# Patient Record
Sex: FEMALE | Race: WHITE | Employment: UNEMPLOYED | URBAN - METROPOLITAN AREA
[De-identification: names, ages, dates, MRNs, and addresses within clinical notes are randomized per-mention and may not be internally consistent; named-entity substitution may affect disease eponyms.]

---

## 2018-05-09 PROBLEM — N28.1 BILATERAL RENAL CYSTS: Status: ACTIVE | Noted: 2018-05-09

## 2020-08-21 LAB — CREATININE, EXTERNAL: 0.8

## 2020-09-21 ENCOUNTER — OFFICE VISIT (OUTPATIENT)
Dept: SURGERY | Age: 60
End: 2020-09-21
Payer: COMMERCIAL

## 2020-09-21 VITALS — TEMPERATURE: 98.4 F | BODY MASS INDEX: 27.12 KG/M2 | WEIGHT: 168 LBS

## 2020-09-21 DIAGNOSIS — N32.9 URINARY BLADDER DISORDER: Primary | ICD-10-CM

## 2020-09-21 PROCEDURE — 99203 OFFICE O/P NEW LOW 30 MIN: CPT | Performed by: SURGERY

## 2020-09-21 RX ORDER — ESTRADIOL 1 MG/1
1 TABLET ORAL DAILY
COMMUNITY

## 2020-09-21 RX ORDER — BUPROPION HYDROCHLORIDE 150 MG/1
150 TABLET ORAL
COMMUNITY
End: 2020-09-21

## 2020-09-21 RX ORDER — BUPROPION HYDROCHLORIDE 150 MG/1
TABLET ORAL
Qty: 30 TAB | Refills: 3 | Status: SHIPPED | OUTPATIENT
Start: 2020-09-21

## 2020-09-21 NOTE — PROGRESS NOTES
History of Present Illness  Frieda Aguirre is a 63 y.o. female presents for Abdominal Pain     63year old female with several month history of suprapubic pain, deep pelvic pain that radiates bilateral lower pelvis. She feels something is \"falling out\" of her pelvic region but does not appreciate any bulging in her vagina. She feels inability to completely void. She had complete hysterectomy in the past for endometriosis and reports no problems since her surgery. Last colonoscopy was 2 years ago and was normal. She was recently seen by GYN for this pain and underwent pelvic ultrasound which the patient reported was normal.     Review of Systems   Constitutional: Negative for chills, diaphoresis, fever, malaise/fatigue and weight loss. Respiratory: Negative for cough, hemoptysis and shortness of breath. Cardiovascular: Negative for chest pain, palpitations, orthopnea and claudication. Gastrointestinal: Positive for abdominal pain. Negative for blood in stool, constipation, heartburn, nausea and vomiting. Genitourinary: Negative for dysuria, flank pain, frequency, hematuria and urgency. Musculoskeletal: Negative for myalgias and neck pain. Skin: Negative for itching and rash. Neurological: Negative for dizziness and headaches. Endo/Heme/Allergies: Does not bruise/bleed easily. Current Outpatient Medications:     estradioL (ESTRACE) 1 mg tablet, Take 1 mg by mouth daily. , Disp: , Rfl:     buPROPion XL (WELLBUTRIN XL) 150 mg tablet, Take 150 mg by mouth every morning., Disp: , Rfl:     PROAIR HFA 90 mcg/actuation inhaler, , Disp: , Rfl: 5    ALPRAZolam (XANAX) 0.5 mg tablet, , Disp: , Rfl: 1    fluticasone (FLONASE) 50 mcg/actuation nasal spray, , Disp: , Rfl: 2    lisinopril (PRINIVIL, ZESTRIL) 20 mg tablet, , Disp: , Rfl: 5    pantoprazole (PROTONIX) 40 mg tablet, , Disp: , Rfl: 3    acetaminophen (TYLENOL) 325 mg tablet, Take  by mouth every four (4) hours as needed for Pain., Disp: , Rfl:     PREMARIN 0.625 mg tablet, , Disp: , Rfl: 3    Allergies   Allergen Reactions    Aspirin Unknown (comments)    Avelox [Moxifloxacin] Unknown (comments)    Bactrim [Sulfamethoprim] Unknown (comments)    Biaxin [Clarithromycin] Drowsiness    Ciprofloxacin Drowsiness    Citalopram Unknown (comments)    Clindamycin Unknown (comments)    Doxycycline Unknown (comments)    Levaquin [Levofloxacin] Unknown (comments)    Macrobid [Nitrofurantoin Monohyd/M-Cryst] Unknown (comments)    Penicillins Unknown (comments)    Percocet [Oxycodone-Acetaminophen] Unknown (comments)    Pravachol [Pravastatin] Unknown (comments)    Sulfa (Sulfonamide Antibiotics) Drowsiness    Tamiflu [Oseltamivir] Unknown (comments)    Tetracycline Unknown (comments)    Vancomycin Unknown (comments)    Vibramycin [Doxycycline Calcium] Unknown (comments)       Past Medical History:   Diagnosis Date    Anxiety     Chronic sinusitis     GERD (gastroesophageal reflux disease)     History of recurrent UTIs     Hypertension     Left arm pain     Low back pain     Myeloradiculopathy     Neck pain     Renal cyst        Past Surgical History:   Procedure Laterality Date    HX HEMORRHOIDECTOMY      HX HYSTERECTOMY      HX LUMBAR FUSION      HX LUMBAR LAMINECTOMY      HX OTHER SURGICAL      nasal surgery    HX OTHER SURGICAL      foot surgery    HX OTHER SURGICAL      Cervical dempression    HX OTHER SURGICAL      cervical fusion       Family History   Problem Relation Age of Onset    Hypertension Mother     COPD Father         Social History     Socioeconomic History    Marital status:      Spouse name: Not on file    Number of children: Not on file    Years of education: Not on file    Highest education level: Not on file   Occupational History    Not on file   Social Needs    Financial resource strain: Not on file    Food insecurity     Worry: Not on file     Inability: Not on file   Blowtorch needs     Medical: Not on file     Non-medical: Not on file   Tobacco Use    Smoking status: Former Smoker    Smokeless tobacco: Never Used    Tobacco comment: Quit in    Substance and Sexual Activity    Alcohol use: No    Drug use: No    Sexual activity: Not on file   Lifestyle    Physical activity     Days per week: Not on file     Minutes per session: Not on file    Stress: Not on file   Relationships    Social connections     Talks on phone: Not on file     Gets together: Not on file     Attends Taoism service: Not on file     Active member of club or organization: Not on file     Attends meetings of clubs or organizations: Not on file     Relationship status: Not on file    Intimate partner violence     Fear of current or ex partner: Not on file     Emotionally abused: Not on file     Physically abused: Not on file     Forced sexual activity: Not on file   Other Topics Concern    Not on file   Social History Narrative    Not on file       XR Results (maximum last 2): No results found for this or any previous visit. CT Results (maximum last 2):   Results from Abstract encounter on 20   CT ABD PELV W WO CONT   Results from Abstract encounter on 18   CT ABD PELV W CONT    Impression Auth Prov: Minnie Orthodoxy  CC Prov:                 [address] [facility] Cat Scan  [address]  [phone]      Imaging Result     Name:    Giancarlo Duque ([id number])  Sex: Female :    61year old   Patient Class:  Emergency Dept Diagnosis:                  Procedures Performed: Exam Date/Time: Reason for Exam:  CT ABD/PELVIS-IV ONLY  [id number]  2018 11:12 PM   Abdominal pain, unspecified              EXAM: CT of the abdomen and pelvis     INDICATION: Abdominal pain.     COMPARISON: None.     TECHNIQUE: Axial CT imaging of the abdomen and pelvis was performed without oral and left intravenous contrast. Multiplanar reformats were generated. One or more dose reduction techniques were used on this CT: automated exposure control, adjustment of the mAs and/or kVp according to patient size, and iterative reconstruction techniques. The specific techniques used on this CT exam have been documented in the patient's electronic medical record.     _______________     FINDINGS:     LOWER CHEST: Unremarkable.     LIVER, BILIARY: Liver is normal. No biliary dilation. Gallbladder is unremarkable.     PANCREAS: Normal.     SPLEEN: Normal.     ADRENALS: Normal.     KIDNEYS: Renal parenchymal enhancement is symmetric. No hydronephrosis or nephrolithiasis. There is a slightly exophytic nodule arising from the upper pole of the left kidney (image 36) with attenuation measuring approximately 26 Hounsfield units. Size of this nodule is approximately 1.1 x 1.5 cm. Upper pole right and lower pole left renal cysts noted.     LYMPH NODES: No enlarged lymph nodes.     GASTROINTESTINAL TRACT: There is a small hiatal hernia. Normal caliber small and large intestine. No bowel obstruction. No free intraperitoneal gas. Normal appendix.       PELVIC ORGANS: Prior hysterectomy.     VASCULATURE: Aortobiiliac atherosclerotic calcification present without evidence of aneurysmal dilatation.     BONES: No acute or aggressive osseous abnormalities identified. Prior posterior instrumented spinal fusion, decompression, an interbody fixation L4-L5.     OTHER: None.     _______________  IMPRESSION  IMPRESSION:     1. No acute intra-abdominal or pelvic abnormality. 2. Small hiatal hernia. 3. Small bilateral renal cysts, with additional indeterminate upper pole left renal hypodensity, potentially a proteinaceous cyst. Comparison prior cross-sectional imaging recommended if available.  If no prior exams are available, further characterization could be performed with a nonemergent retroperitoneal sonogram.     Dictated by: Nathaniel Caban on Wed Apr 18, 2018 11:42:26 PM EDT      Signed Betzaida Contreras MD   4/18/2018 11:48 PM    [facility] [[id number]]    ~~This report was copied and pasted from the servicing EHR system. ~~     CT ABD PELV W CONT       MRI Results (maximum last 2): Results from Abstract encounter on 11/02/15   MRI 62 [facility]       Nuclear Medicine Results (maximum last 3): No results found for this or any previous visit. US Results (maximum last 2): Results from Abstract encounter on 05/08/18   US RETROPERITONEUM COMP       STEF Results (maximum last 2): No results found for this or any previous visit. IR Results (maximum last 2): No results found for this or any previous visit. VAS/US Results (maximum last 2): No results found for this or any previous visit. PET Results (maximum last 2): No results found for this or any previous visit. Visit Vitals  Temp 98.4 °F (36.9 °C)   Wt 76.2 kg (168 lb)   BMI 27.12 kg/m²        Physical Exam  Constitutional:       Appearance: Normal appearance. She is normal weight. HENT:      Head: Normocephalic and atraumatic. Eyes:      Extraocular Movements: Extraocular movements intact. Conjunctiva/sclera: Conjunctivae normal.      Pupils: Pupils are equal, round, and reactive to light. Pulmonary:      Effort: Pulmonary effort is normal.   Abdominal:      General: Abdomen is flat. Bowel sounds are normal. There is no distension. Palpations: Abdomen is soft. There is no mass. Tenderness: There is abdominal tenderness. There is left CVA tenderness. There is no right CVA tenderness, guarding or rebound. Hernia: No hernia is present. Comments: Tender suprapubic region    Musculoskeletal: Normal range of motion. Skin:     General: Skin is warm and dry. Neurological:      General: No focal deficit present. Mental Status: She is alert and oriented to person, place, and time. Mental status is at baseline.    Psychiatric:         Mood and Affect: Mood normal. Behavior: Behavior normal.         Thought Content: Thought content normal.         Judgment: Judgment normal.         Assessment and Plan:  1. Urinary bladder disorder- I do not appreciate any hernia on exam but this sounds like a possible cystocele or urological problem outside of my specialty. GYN did not appreciate anything on exam either per patient . Imaging was reviewed. I will refer her to urology for additional workup. She understands this recommendation. Lion Reid, DO    CC Providers:  Garret Rodrigues MD  No ref.  provider found

## 2020-09-21 NOTE — PROGRESS NOTES
Macie Back presents today for   Chief Complaint   Patient presents with    Abdominal Pain       Is someone accompanying this pt? yes    Is the patient using any DME equipment during OV? no    Depression Screening:  3 most recent PHQ Screens 9/21/2020   Little interest or pleasure in doing things Not at all   Feeling down, depressed, irritable, or hopeless Not at all   Total Score PHQ 2 0       Learning Assessment:  No flowsheet data found. Abuse Screening:  No flowsheet data found. Fall Risk  No flowsheet data found. Coordination of Care:  1. Have you been to the ER, urgent care clinic since your last visit? Hospitalized since your last visit? NA, referal from Dr Antonio Bautista    2. Have you seen or consulted any other health care providers outside of the [address] [place] [address] since your last visit? Include any pap smears or colon screening.  no

## 2020-10-07 ENCOUNTER — HOSPITAL ENCOUNTER (OUTPATIENT)
Dept: LAB | Age: 60
Discharge: HOME | End: 2020-10-07

## 2020-10-07 ENCOUNTER — TRANSCRIBE ORDER (OUTPATIENT)
Dept: REGISTRATION | Age: 60
End: 2020-10-07

## 2020-10-07 DIAGNOSIS — E53.8 BIOTIN-(PROPIONYL-COA-CARBOXYLASE) LIGASE DEFICIENCY: ICD-10-CM

## 2020-10-07 DIAGNOSIS — D52.9 FOLATE-DEFICIENCY ANEMIA: ICD-10-CM

## 2020-10-07 DIAGNOSIS — D64.9 ANEMIA, UNSPECIFIED: ICD-10-CM

## 2020-10-07 DIAGNOSIS — E78.00 PURE HYPERCHOLESTEROLEMIA: ICD-10-CM

## 2020-10-07 DIAGNOSIS — I10 ESSENTIAL HYPERTENSION, MALIGNANT: ICD-10-CM

## 2020-10-07 DIAGNOSIS — I10 ESSENTIAL HYPERTENSION, MALIGNANT: Primary | ICD-10-CM

## 2022-03-19 PROBLEM — N28.1 BILATERAL RENAL CYSTS: Status: ACTIVE | Noted: 2018-05-09

## 2022-06-14 ENCOUNTER — HOSPITAL ENCOUNTER (OUTPATIENT)
Dept: MAMMOGRAPHY | Age: 62
Discharge: HOME | End: 2022-06-14

## 2022-06-14 ENCOUNTER — TRANSCRIBE ORDER (OUTPATIENT)
Dept: SCHEDULING | Age: 62
End: 2022-06-14

## 2022-06-14 DIAGNOSIS — Z12.31 VISIT FOR SCREENING MAMMOGRAM: Primary | ICD-10-CM

## 2022-06-14 DIAGNOSIS — Z12.31 VISIT FOR SCREENING MAMMOGRAM: ICD-10-CM

## 2022-06-14 PROCEDURE — 77063 BREAST TOMOSYNTHESIS BI: CPT

## 2024-05-09 ENCOUNTER — HOSPITAL ENCOUNTER (EMERGENCY)
Age: 64
Discharge: ANOTHER HOSPITAL | End: 2024-05-09
Attending: EMERGENCY MEDICINE
Payer: COMMERCIAL

## 2024-05-09 ENCOUNTER — APPOINTMENT (OUTPATIENT)
Age: 64
End: 2024-05-09
Payer: COMMERCIAL

## 2024-05-09 VITALS
OXYGEN SATURATION: 100 % | HEIGHT: 66 IN | HEART RATE: 78 BPM | BODY MASS INDEX: 23.46 KG/M2 | SYSTOLIC BLOOD PRESSURE: 132 MMHG | WEIGHT: 146 LBS | DIASTOLIC BLOOD PRESSURE: 63 MMHG | TEMPERATURE: 98.5 F | RESPIRATION RATE: 16 BRPM

## 2024-05-09 DIAGNOSIS — K81.0 ACUTE CHOLECYSTITIS: Primary | ICD-10-CM

## 2024-05-09 DIAGNOSIS — K83.1 BILIARY OBSTRUCTION: ICD-10-CM

## 2024-05-09 LAB
ALBUMIN SERPL-MCNC: 3.4 G/DL (ref 3.4–5)
ALBUMIN/GLOB SERPL: 0.8 (ref 0.8–1.7)
ALP SERPL-CCNC: 100 U/L (ref 45–117)
ALT SERPL-CCNC: 15 U/L (ref 13–56)
ANION GAP SERPL CALC-SCNC: 5 MMOL/L (ref 3–18)
AST SERPL W P-5'-P-CCNC: 9 U/L (ref 10–38)
BASOPHILS # BLD: 0.1 K/UL (ref 0–0.1)
BASOPHILS NFR BLD: 1 % (ref 0–2)
BILIRUB DIRECT SERPL-MCNC: <0.1 MG/DL (ref 0–0.2)
BILIRUB SERPL-MCNC: 0.1 MG/DL (ref 0.2–1)
BUN SERPL-MCNC: 11 MG/DL (ref 7–18)
BUN/CREAT SERPL: 11 (ref 12–20)
CA-I BLD-MCNC: 9 MG/DL (ref 8.5–10.1)
CHLORIDE SERPL-SCNC: 110 MMOL/L (ref 100–111)
CO2 SERPL-SCNC: 29 MMOL/L (ref 21–32)
CREAT SERPL-MCNC: 0.98 MG/DL (ref 0.6–1.3)
DIFFERENTIAL METHOD BLD: ABNORMAL
EOSINOPHIL # BLD: 0.2 K/UL (ref 0–0.4)
EOSINOPHIL NFR BLD: 2 % (ref 0–5)
ERYTHROCYTE [DISTWIDTH] IN BLOOD BY AUTOMATED COUNT: 13.2 % (ref 11.6–14.5)
GLOBULIN SER CALC-MCNC: 4.1 G/DL (ref 2–4)
GLUCOSE SERPL-MCNC: 110 MG/DL (ref 74–99)
HCT VFR BLD AUTO: 36.3 % (ref 35–45)
HGB BLD-MCNC: 12 G/DL (ref 12–16)
IMM GRANULOCYTES # BLD AUTO: 0 K/UL (ref 0–0.04)
IMM GRANULOCYTES NFR BLD AUTO: 0 % (ref 0–0.5)
LIPASE SERPL-CCNC: 29 U/L (ref 13–75)
LYMPHOCYTES # BLD: 2.2 K/UL (ref 0.9–3.6)
LYMPHOCYTES NFR BLD: 24 % (ref 21–52)
MCH RBC QN AUTO: 30.6 PG (ref 24–34)
MCHC RBC AUTO-ENTMCNC: 33.1 G/DL (ref 31–37)
MCV RBC AUTO: 92.6 FL (ref 78–100)
MONOCYTES # BLD: 0.7 K/UL (ref 0.05–1.2)
MONOCYTES NFR BLD: 7 % (ref 3–10)
NEUTS SEG # BLD: 6.1 K/UL (ref 1.8–8)
NEUTS SEG NFR BLD: 66 % (ref 40–73)
NRBC # BLD: 0 K/UL (ref 0–0.01)
NRBC BLD-RTO: 0 PER 100 WBC
PLATELET # BLD AUTO: 269 K/UL (ref 135–420)
PMV BLD AUTO: 11 FL (ref 9.2–11.8)
POTASSIUM SERPL-SCNC: 3.7 MMOL/L (ref 3.5–5.5)
PROT SERPL-MCNC: 7.5 G/DL (ref 6.4–8.2)
RBC # BLD AUTO: 3.92 M/UL (ref 4.2–5.3)
SODIUM SERPL-SCNC: 144 MMOL/L (ref 136–145)
TROPONIN I SERPL HS-MCNC: 5 NG/L (ref 0–54)
WBC # BLD AUTO: 9.3 K/UL (ref 4.6–13.2)

## 2024-05-09 PROCEDURE — 96375 TX/PRO/DX INJ NEW DRUG ADDON: CPT

## 2024-05-09 PROCEDURE — 93005 ELECTROCARDIOGRAM TRACING: CPT | Performed by: EMERGENCY MEDICINE

## 2024-05-09 PROCEDURE — 96361 HYDRATE IV INFUSION ADD-ON: CPT

## 2024-05-09 PROCEDURE — 99285 EMERGENCY DEPT VISIT HI MDM: CPT

## 2024-05-09 PROCEDURE — 6360000002 HC RX W HCPCS: Performed by: EMERGENCY MEDICINE

## 2024-05-09 PROCEDURE — 74177 CT ABD & PELVIS W/CONTRAST: CPT

## 2024-05-09 PROCEDURE — 80048 BASIC METABOLIC PNL TOTAL CA: CPT

## 2024-05-09 PROCEDURE — 85025 COMPLETE CBC W/AUTO DIFF WBC: CPT

## 2024-05-09 PROCEDURE — 96376 TX/PRO/DX INJ SAME DRUG ADON: CPT

## 2024-05-09 PROCEDURE — 87040 BLOOD CULTURE FOR BACTERIA: CPT

## 2024-05-09 PROCEDURE — 2580000003 HC RX 258: Performed by: EMERGENCY MEDICINE

## 2024-05-09 PROCEDURE — 80076 HEPATIC FUNCTION PANEL: CPT

## 2024-05-09 PROCEDURE — 6360000004 HC RX CONTRAST MEDICATION: Performed by: EMERGENCY MEDICINE

## 2024-05-09 PROCEDURE — 83690 ASSAY OF LIPASE: CPT

## 2024-05-09 PROCEDURE — 84484 ASSAY OF TROPONIN QUANT: CPT

## 2024-05-09 PROCEDURE — 96365 THER/PROPH/DIAG IV INF INIT: CPT

## 2024-05-09 RX ORDER — ONDANSETRON 2 MG/ML
4 INJECTION INTRAMUSCULAR; INTRAVENOUS ONCE
Status: COMPLETED | OUTPATIENT
Start: 2024-05-09 | End: 2024-05-09

## 2024-05-09 RX ORDER — LORAZEPAM 2 MG/ML
0.5 INJECTION INTRAMUSCULAR ONCE
Status: COMPLETED | OUTPATIENT
Start: 2024-05-09 | End: 2024-05-09

## 2024-05-09 RX ORDER — DIPHENHYDRAMINE HYDROCHLORIDE 50 MG/ML
50 INJECTION INTRAMUSCULAR; INTRAVENOUS
Status: COMPLETED | OUTPATIENT
Start: 2024-05-09 | End: 2024-05-09

## 2024-05-09 RX ORDER — METRONIDAZOLE 500 MG/100ML
500 INJECTION, SOLUTION INTRAVENOUS ONCE
Status: COMPLETED | OUTPATIENT
Start: 2024-05-09 | End: 2024-05-09

## 2024-05-09 RX ORDER — 0.9 % SODIUM CHLORIDE 0.9 %
1000 INTRAVENOUS SOLUTION INTRAVENOUS ONCE
Status: COMPLETED | OUTPATIENT
Start: 2024-05-09 | End: 2024-05-09

## 2024-05-09 RX ORDER — MORPHINE SULFATE 4 MG/ML
4 INJECTION, SOLUTION INTRAMUSCULAR; INTRAVENOUS
Status: COMPLETED | OUTPATIENT
Start: 2024-05-09 | End: 2024-05-09

## 2024-05-09 RX ADMIN — SODIUM CHLORIDE 1000 ML: 9 INJECTION, SOLUTION INTRAVENOUS at 16:00

## 2024-05-09 RX ADMIN — ONDANSETRON 4 MG: 2 INJECTION INTRAMUSCULAR; INTRAVENOUS at 16:00

## 2024-05-09 RX ADMIN — LORAZEPAM 0.5 MG: 2 INJECTION INTRAMUSCULAR; INTRAVENOUS at 21:01

## 2024-05-09 RX ADMIN — HYDROMORPHONE HYDROCHLORIDE 1 MG: 1 INJECTION, SOLUTION INTRAMUSCULAR; INTRAVENOUS; SUBCUTANEOUS at 17:52

## 2024-05-09 RX ADMIN — IOPAMIDOL 95 ML: 755 INJECTION, SOLUTION INTRAVENOUS at 16:03

## 2024-05-09 RX ADMIN — HYDROMORPHONE HYDROCHLORIDE 0.5 MG: 1 INJECTION, SOLUTION INTRAMUSCULAR; INTRAVENOUS; SUBCUTANEOUS at 20:27

## 2024-05-09 RX ADMIN — DIPHENHYDRAMINE HYDROCHLORIDE 50 MG: 50 INJECTION INTRAMUSCULAR; INTRAVENOUS at 17:56

## 2024-05-09 RX ADMIN — MORPHINE SULFATE 4 MG: 4 INJECTION, SOLUTION INTRAMUSCULAR; INTRAVENOUS at 16:00

## 2024-05-09 RX ADMIN — METRONIDAZOLE 500 MG: 500 INJECTION, SOLUTION INTRAVENOUS at 17:52

## 2024-05-09 RX ADMIN — WATER 1000 MG: 1 INJECTION INTRAMUSCULAR; INTRAVENOUS; SUBCUTANEOUS at 17:52

## 2024-05-09 ASSESSMENT — LIFESTYLE VARIABLES
HOW OFTEN DO YOU HAVE A DRINK CONTAINING ALCOHOL: NEVER
HOW MANY STANDARD DRINKS CONTAINING ALCOHOL DO YOU HAVE ON A TYPICAL DAY: PATIENT DOES NOT DRINK

## 2024-05-09 ASSESSMENT — PAIN SCALES - GENERAL: PAINLEVEL_OUTOF10: 7

## 2024-05-09 ASSESSMENT — PAIN DESCRIPTION - DESCRIPTORS: DESCRIPTORS: ACHING

## 2024-05-09 ASSESSMENT — PAIN DESCRIPTION - LOCATION: LOCATION: ABDOMEN

## 2024-05-09 ASSESSMENT — PAIN DESCRIPTION - ORIENTATION: ORIENTATION: RIGHT;UPPER

## 2024-05-09 NOTE — ED NOTES
Transfer center called to initiate transfer, [facility]    [facility] due to more resources for the patient     [facility]

## 2024-05-09 NOTE — ED PROVIDER NOTES
[facility] EMERGENCY DEPT  EMERGENCY DEPARTMENT HISTORY AND PHYSICAL EXAM      Date: 5/9/2024  Patient Name: Chichi Ocasio  MRN: [id number]  Birthdate 1960  Date of evaluation: 5/9/2024  Provider: Bruce Ny MD   Note Started: 3:40 PM EDT 5/9/24    HISTORY OF PRESENT ILLNESS     Chief Complaint   Patient presents with    Abdominal Pain       History Provided By: Patient    HPI: Chichi Ocasio is a 63 y.o. female reorts pain right flank and luq pain. Uncomfortable, writhing in pain. Reports nausea and one episode of vomiting.  Symptoms started suddenly today    PAST MEDICAL HISTORY   Past Medical History:  Past Medical History:   Diagnosis Date    Anxiety     Chronic sinusitis     Endocrine disorder     GERD (gastroesophageal reflux disease)     History of recurrent UTIs     Hypertension     Left arm pain     Low back pain     Menopause     Myeloradiculopathy     Neck pain     Renal cyst        Past Surgical History:  Past Surgical History:   Procedure Laterality Date    HEMORRHOID SURGERY      HYSTERECTOMY (CERVIX STATUS UNKNOWN)  1995?    Total    LUMBAR FUSION      LUMBAR LAMINECTOMY      OTHER SURGICAL HISTORY      foot surgery    OTHER SURGICAL HISTORY      Cervical dempression    OTHER SURGICAL HISTORY      cervical fusion    OTHER SURGICAL HISTORY      nasal surgery    OVARY REMOVAL         Family History:  Family History   Problem Relation Age of Onset    COPD Father     Hypertension Mother     Breast Cancer Maternal Aunt        Social History:  Social History     Tobacco Use    Smoking status: Former    Smokeless tobacco: Never    Tobacco comments:     Quit smoking: Quit in 2012   Substance Use Topics    Alcohol use: No    Drug use: No       Allergies:  Allergies   Allergen Reactions    Aspirin      Other reaction(s): Unknown (comments)    Ciprofloxacin      Other reaction(s): Drowsiness    Citalopram      Other reaction(s): Unknown (comments)    Clarithromycin      Other reaction(s): Drowsiness

## 2024-05-09 NOTE — ED TRIAGE NOTES
Abdominal pain since early this morning. States that she took gas medication with no relief. Normal BM this morning. No vomiting. No nausea. Last ate earlier today.

## 2024-05-09 NOTE — ED NOTES
Reports that her pain is better but still very uncomfortable. O2 placed on at 2 LPM for sats 92-95%.

## 2024-05-10 ENCOUNTER — HOSPITAL ENCOUNTER (INPATIENT)
Facility: HOSPITAL | Age: 64
LOS: 1 days | Discharge: HOME | DRG: 419 | End: 2024-05-11
Attending: STUDENT IN AN ORGANIZED HEALTH CARE EDUCATION/TRAINING PROGRAM | Admitting: HOSPITALIST
Payer: COMMERCIAL

## 2024-05-10 ENCOUNTER — ANESTHESIA EVENT (OUTPATIENT)
Facility: HOSPITAL | Age: 64
End: 2024-05-10
Payer: COMMERCIAL

## 2024-05-10 ENCOUNTER — ANESTHESIA (OUTPATIENT)
Facility: HOSPITAL | Age: 64
End: 2024-05-10
Payer: COMMERCIAL

## 2024-05-10 DIAGNOSIS — K81.9 CHOLECYSTITIS: Primary | ICD-10-CM

## 2024-05-10 DIAGNOSIS — K83.8 INTRAHEPATIC BILE DUCT DILATION: ICD-10-CM

## 2024-05-10 DIAGNOSIS — K81.0 ACUTE CHOLECYSTITIS: ICD-10-CM

## 2024-05-10 LAB
ALBUMIN SERPL-MCNC: 3 G/DL (ref 3.5–5)
ALBUMIN/GLOB SERPL: 0.8 (ref 1.1–2.2)
ALP SERPL-CCNC: 80 U/L (ref 45–117)
ALT SERPL-CCNC: 13 U/L (ref 12–78)
ANION GAP SERPL CALC-SCNC: 2 MMOL/L (ref 5–15)
APTT PPP: 28.2 SEC (ref 21.2–34.1)
AST SERPL W P-5'-P-CCNC: 12 U/L (ref 15–37)
BASOPHILS # BLD: 0 K/UL (ref 0–0.1)
BASOPHILS NFR BLD: 0 % (ref 0–1)
BILIRUB DIRECT SERPL-MCNC: <0.1 MG/DL (ref 0–0.2)
BILIRUB SERPL-MCNC: 0.2 MG/DL (ref 0.2–1)
BUN SERPL-MCNC: 8 MG/DL (ref 6–20)
BUN/CREAT SERPL: 9 (ref 12–20)
CA-I BLD-MCNC: 8.3 MG/DL (ref 8.5–10.1)
CHLORIDE SERPL-SCNC: 110 MMOL/L (ref 97–108)
CK SERPL-CCNC: 61 U/L (ref 26–192)
CO2 SERPL-SCNC: 27 MMOL/L (ref 21–32)
CREAT SERPL-MCNC: 0.88 MG/DL (ref 0.55–1.02)
DIFFERENTIAL METHOD BLD: ABNORMAL
EKG ATRIAL RATE: 65 BPM
EKG DIAGNOSIS: NORMAL
EKG DIAGNOSIS: NORMAL
EKG P AXIS: 86 DEGREES
EKG P-R INTERVAL: 172 MS
EKG Q-T INTERVAL: 428 MS
EKG Q-T INTERVAL: 444 MS
EKG QRS DURATION: 82 MS
EKG QRS DURATION: 86 MS
EKG QTC CALCULATION (BAZETT): 445 MS
EKG QTC CALCULATION (BAZETT): 469 MS
EKG R AXIS: 45 DEGREES
EKG R AXIS: 64 DEGREES
EKG T AXIS: 50 DEGREES
EKG T AXIS: 67 DEGREES
EKG VENTRICULAR RATE: 65 BPM
EKG VENTRICULAR RATE: 67 BPM
EOSINOPHIL # BLD: 0.1 K/UL (ref 0–0.4)
EOSINOPHIL NFR BLD: 1 % (ref 0–7)
ERYTHROCYTE [DISTWIDTH] IN BLOOD BY AUTOMATED COUNT: 13.1 % (ref 11.5–14.5)
GLOBULIN SER CALC-MCNC: 3.6 G/DL (ref 2–4)
GLUCOSE SERPL-MCNC: 102 MG/DL (ref 65–100)
HCT VFR BLD AUTO: 33.5 % (ref 35–47)
HGB BLD-MCNC: 10.7 G/DL (ref 11.5–16)
IMM GRANULOCYTES # BLD AUTO: 0 K/UL
IMM GRANULOCYTES NFR BLD AUTO: 0 %
INR PPP: 1.1 (ref 0.9–1.1)
LIPASE SERPL-CCNC: 14 U/L (ref 13–75)
LYMPHOCYTES # BLD: 2 K/UL (ref 0.8–3.5)
LYMPHOCYTES NFR BLD: 17 % (ref 12–49)
MCH RBC QN AUTO: 29.7 PG (ref 26–34)
MCHC RBC AUTO-ENTMCNC: 31.9 G/DL (ref 30–36.5)
MCV RBC AUTO: 93.1 FL (ref 80–99)
MONOCYTES # BLD: 0.8 K/UL (ref 0–1)
MONOCYTES NFR BLD: 7 % (ref 5–13)
NEUTS SEG # BLD: 8.6 K/UL (ref 1.8–8)
NEUTS SEG NFR BLD: 75 % (ref 32–75)
NRBC BLD-RTO: 0 PER 100 WBC
PHOSPHATE SERPL-MCNC: 2.2 MG/DL (ref 2.6–4.7)
PLATELET # BLD AUTO: 229 K/UL (ref 150–400)
PMV BLD AUTO: 11.2 FL (ref 8.9–12.9)
POTASSIUM SERPL-SCNC: 4 MMOL/L (ref 3.5–5.1)
PROCALCITONIN SERPL-MCNC: <0.05 NG/ML
PROT SERPL-MCNC: 6.6 G/DL (ref 6.4–8.2)
PROTHROMBIN TIME: 14.4 SEC (ref 11.9–14.6)
RBC # BLD AUTO: 3.6 M/UL (ref 3.8–5.2)
RBC MORPH BLD: ABNORMAL
SODIUM SERPL-SCNC: 139 MMOL/L (ref 136–145)
THERAPEUTIC RANGE: NORMAL SEC (ref 82–109)
WBC # BLD AUTO: 11.5 K/UL (ref 3.6–11)
WBC MORPH BLD: ABNORMAL

## 2024-05-10 PROCEDURE — 7100000001 HC PACU RECOVERY - ADDTL 15 MIN: Performed by: SURGERY

## 2024-05-10 PROCEDURE — 99285 EMERGENCY DEPT VISIT HI MDM: CPT

## 2024-05-10 PROCEDURE — 85610 PROTHROMBIN TIME: CPT

## 2024-05-10 PROCEDURE — 6360000002 HC RX W HCPCS: Performed by: SURGERY

## 2024-05-10 PROCEDURE — 36415 COLL VENOUS BLD VENIPUNCTURE: CPT

## 2024-05-10 PROCEDURE — 6360000002 HC RX W HCPCS: Performed by: ANESTHESIOLOGY

## 2024-05-10 PROCEDURE — 6360000002 HC RX W HCPCS: Performed by: HOSPITALIST

## 2024-05-10 PROCEDURE — 82550 ASSAY OF CK (CPK): CPT

## 2024-05-10 PROCEDURE — G0378 HOSPITAL OBSERVATION PER HR: HCPCS

## 2024-05-10 PROCEDURE — 99222 1ST HOSP IP/OBS MODERATE 55: CPT | Performed by: SURGERY

## 2024-05-10 PROCEDURE — 2580000003 HC RX 258: Performed by: SURGERY

## 2024-05-10 PROCEDURE — 83690 ASSAY OF LIPASE: CPT

## 2024-05-10 PROCEDURE — 6370000000 HC RX 637 (ALT 250 FOR IP): Performed by: ANESTHESIOLOGY

## 2024-05-10 PROCEDURE — 47562 LAPAROSCOPIC CHOLECYSTECTOMY: CPT | Performed by: SURGERY

## 2024-05-10 PROCEDURE — 84100 ASSAY OF PHOSPHORUS: CPT

## 2024-05-10 PROCEDURE — 2500000003 HC RX 250 WO HCPCS: Performed by: HOSPITALIST

## 2024-05-10 PROCEDURE — 6370000000 HC RX 637 (ALT 250 FOR IP): Performed by: SURGERY

## 2024-05-10 PROCEDURE — 85025 COMPLETE CBC W/AUTO DIFF WBC: CPT

## 2024-05-10 PROCEDURE — 80076 HEPATIC FUNCTION PANEL: CPT

## 2024-05-10 PROCEDURE — 0FT44ZZ RESECTION OF GALLBLADDER, PERCUTANEOUS ENDOSCOPIC APPROACH: ICD-10-PCS | Performed by: SURGERY

## 2024-05-10 PROCEDURE — 6360000002 HC RX W HCPCS: Performed by: NURSE ANESTHETIST, CERTIFIED REGISTERED

## 2024-05-10 PROCEDURE — 7100000000 HC PACU RECOVERY - FIRST 15 MIN: Performed by: SURGERY

## 2024-05-10 PROCEDURE — 2500000003 HC RX 250 WO HCPCS: Performed by: NURSE ANESTHETIST, CERTIFIED REGISTERED

## 2024-05-10 PROCEDURE — 2580000003 HC RX 258: Performed by: NURSE ANESTHETIST, CERTIFIED REGISTERED

## 2024-05-10 PROCEDURE — 93005 ELECTROCARDIOGRAM TRACING: CPT | Performed by: STUDENT IN AN ORGANIZED HEALTH CARE EDUCATION/TRAINING PROGRAM

## 2024-05-10 PROCEDURE — 2720000010 HC SURG SUPPLY STERILE: Performed by: SURGERY

## 2024-05-10 PROCEDURE — 2580000003 HC RX 258: Performed by: STUDENT IN AN ORGANIZED HEALTH CARE EDUCATION/TRAINING PROGRAM

## 2024-05-10 PROCEDURE — 1100000000 HC RM PRIVATE

## 2024-05-10 PROCEDURE — 2580000003 HC RX 258: Performed by: HOSPITALIST

## 2024-05-10 PROCEDURE — 3600000004 HC SURGERY LEVEL 4 BASE: Performed by: SURGERY

## 2024-05-10 PROCEDURE — 88304 TISSUE EXAM BY PATHOLOGIST: CPT

## 2024-05-10 PROCEDURE — 2500000003 HC RX 250 WO HCPCS: Performed by: SURGERY

## 2024-05-10 PROCEDURE — 3600000014 HC SURGERY LEVEL 4 ADDTL 15MIN: Performed by: SURGERY

## 2024-05-10 PROCEDURE — 3700000000 HC ANESTHESIA ATTENDED CARE: Performed by: SURGERY

## 2024-05-10 PROCEDURE — 80048 BASIC METABOLIC PNL TOTAL CA: CPT

## 2024-05-10 PROCEDURE — 6370000000 HC RX 637 (ALT 250 FOR IP): Performed by: HOSPITALIST

## 2024-05-10 PROCEDURE — 2709999900 HC NON-CHARGEABLE SUPPLY: Performed by: SURGERY

## 2024-05-10 PROCEDURE — 2580000003 HC RX 258: Performed by: ANESTHESIOLOGY

## 2024-05-10 PROCEDURE — 3700000001 HC ADD 15 MINUTES (ANESTHESIA): Performed by: SURGERY

## 2024-05-10 PROCEDURE — 85730 THROMBOPLASTIN TIME PARTIAL: CPT

## 2024-05-10 PROCEDURE — 84145 PROCALCITONIN (PCT): CPT

## 2024-05-10 RX ORDER — PHENYLEPHRINE HYDROCHLORIDE 10 MG/ML
INJECTION INTRAVENOUS PRN
Status: DISCONTINUED | OUTPATIENT
Start: 2024-05-10 | End: 2024-05-10 | Stop reason: SDUPTHER

## 2024-05-10 RX ORDER — ONDANSETRON 2 MG/ML
4 INJECTION INTRAMUSCULAR; INTRAVENOUS
Status: DISCONTINUED | OUTPATIENT
Start: 2024-05-10 | End: 2024-05-10

## 2024-05-10 RX ORDER — HYDROMORPHONE HYDROCHLORIDE 1 MG/ML
0.5 INJECTION, SOLUTION INTRAMUSCULAR; INTRAVENOUS; SUBCUTANEOUS EVERY 5 MIN PRN
Status: DISCONTINUED | OUTPATIENT
Start: 2024-05-10 | End: 2024-05-10

## 2024-05-10 RX ORDER — SUCCINYLCHOLINE/SOD CL,ISO/PF 200MG/10ML
SYRINGE (ML) INTRAVENOUS PRN
Status: DISCONTINUED | OUTPATIENT
Start: 2024-05-10 | End: 2024-05-10 | Stop reason: SDUPTHER

## 2024-05-10 RX ORDER — PANTOPRAZOLE SODIUM 40 MG/1
40 TABLET, DELAYED RELEASE ORAL DAILY
Status: DISCONTINUED | OUTPATIENT
Start: 2024-05-10 | End: 2024-05-11 | Stop reason: HOSPADM

## 2024-05-10 RX ORDER — DIPHENHYDRAMINE HCL 25 MG
25 CAPSULE ORAL EVERY 6 HOURS PRN
Status: DISCONTINUED | OUTPATIENT
Start: 2024-05-10 | End: 2024-05-11 | Stop reason: HOSPADM

## 2024-05-10 RX ORDER — DEXAMETHASONE SODIUM PHOSPHATE 4 MG/ML
INJECTION, SOLUTION INTRA-ARTICULAR; INTRALESIONAL; INTRAMUSCULAR; INTRAVENOUS; SOFT TISSUE PRN
Status: DISCONTINUED | OUTPATIENT
Start: 2024-05-10 | End: 2024-05-10 | Stop reason: SDUPTHER

## 2024-05-10 RX ORDER — DIPHENHYDRAMINE HYDROCHLORIDE 50 MG/ML
12.5 INJECTION INTRAMUSCULAR; INTRAVENOUS
Status: DISCONTINUED | OUTPATIENT
Start: 2024-05-10 | End: 2024-05-10

## 2024-05-10 RX ORDER — ONDANSETRON 4 MG/1
4 TABLET, ORALLY DISINTEGRATING ORAL EVERY 8 HOURS PRN
Status: DISCONTINUED | OUTPATIENT
Start: 2024-05-10 | End: 2024-05-10

## 2024-05-10 RX ORDER — SODIUM CHLORIDE, SODIUM LACTATE, POTASSIUM CHLORIDE, CALCIUM CHLORIDE 600; 310; 30; 20 MG/100ML; MG/100ML; MG/100ML; MG/100ML
INJECTION, SOLUTION INTRAVENOUS CONTINUOUS PRN
Status: DISCONTINUED | OUTPATIENT
Start: 2024-05-10 | End: 2024-05-10 | Stop reason: SDUPTHER

## 2024-05-10 RX ORDER — METRONIDAZOLE 500 MG/100ML
500 INJECTION, SOLUTION INTRAVENOUS EVERY 8 HOURS
Status: DISCONTINUED | OUTPATIENT
Start: 2024-05-10 | End: 2024-05-10

## 2024-05-10 RX ORDER — OXYCODONE HYDROCHLORIDE 5 MG/1
5 TABLET ORAL PRN
Status: DISCONTINUED | OUTPATIENT
Start: 2024-05-10 | End: 2024-05-10

## 2024-05-10 RX ORDER — LIDOCAINE 4 G/G
1 PATCH TOPICAL AS NEEDED
Status: DISCONTINUED | OUTPATIENT
Start: 2024-05-10 | End: 2024-05-10

## 2024-05-10 RX ORDER — SODIUM CHLORIDE 9 MG/ML
INJECTION, SOLUTION INTRAVENOUS PRN
Status: DISCONTINUED | OUTPATIENT
Start: 2024-05-10 | End: 2024-05-11 | Stop reason: HOSPADM

## 2024-05-10 RX ORDER — DEXTROSE MONOHYDRATE 100 MG/ML
INJECTION, SOLUTION INTRAVENOUS CONTINUOUS PRN
Status: DISCONTINUED | OUTPATIENT
Start: 2024-05-10 | End: 2024-05-10

## 2024-05-10 RX ORDER — SODIUM CHLORIDE 0.9 % (FLUSH) 0.9 %
5-40 SYRINGE (ML) INJECTION EVERY 12 HOURS SCHEDULED
Status: DISCONTINUED | OUTPATIENT
Start: 2024-05-10 | End: 2024-05-11 | Stop reason: HOSPADM

## 2024-05-10 RX ORDER — ONDANSETRON 2 MG/ML
4 INJECTION INTRAMUSCULAR; INTRAVENOUS EVERY 6 HOURS PRN
Status: DISCONTINUED | OUTPATIENT
Start: 2024-05-10 | End: 2024-05-10

## 2024-05-10 RX ORDER — ENOXAPARIN SODIUM 100 MG/ML
40 INJECTION SUBCUTANEOUS DAILY
Status: DISCONTINUED | OUTPATIENT
Start: 2024-05-10 | End: 2024-05-11 | Stop reason: HOSPADM

## 2024-05-10 RX ORDER — ONDANSETRON 4 MG/1
4 TABLET, ORALLY DISINTEGRATING ORAL EVERY 8 HOURS PRN
Status: DISCONTINUED | OUTPATIENT
Start: 2024-05-10 | End: 2024-05-11 | Stop reason: HOSPADM

## 2024-05-10 RX ORDER — ONDANSETRON 2 MG/ML
INJECTION INTRAMUSCULAR; INTRAVENOUS PRN
Status: DISCONTINUED | OUTPATIENT
Start: 2024-05-10 | End: 2024-05-10 | Stop reason: SDUPTHER

## 2024-05-10 RX ORDER — LIDOCAINE HYDROCHLORIDE 20 MG/ML
INJECTION, SOLUTION EPIDURAL; INFILTRATION; INTRACAUDAL; PERINEURAL PRN
Status: DISCONTINUED | OUTPATIENT
Start: 2024-05-10 | End: 2024-05-10 | Stop reason: SDUPTHER

## 2024-05-10 RX ORDER — LORAZEPAM 2 MG/ML
0.5 INJECTION INTRAMUSCULAR
Status: DISCONTINUED | OUTPATIENT
Start: 2024-05-10 | End: 2024-05-10

## 2024-05-10 RX ORDER — SODIUM CHLORIDE, SODIUM LACTATE, POTASSIUM CHLORIDE, CALCIUM CHLORIDE 600; 310; 30; 20 MG/100ML; MG/100ML; MG/100ML; MG/100ML
INJECTION, SOLUTION INTRAVENOUS CONTINUOUS
Status: DISPENSED | OUTPATIENT
Start: 2024-05-10 | End: 2024-05-10

## 2024-05-10 RX ORDER — ROCURONIUM BROMIDE 10 MG/ML
INJECTION, SOLUTION INTRAVENOUS PRN
Status: DISCONTINUED | OUTPATIENT
Start: 2024-05-10 | End: 2024-05-10 | Stop reason: SDUPTHER

## 2024-05-10 RX ORDER — FENTANYL CITRATE 50 UG/ML
50 INJECTION, SOLUTION INTRAMUSCULAR; INTRAVENOUS EVERY 5 MIN PRN
Status: DISCONTINUED | OUTPATIENT
Start: 2024-05-10 | End: 2024-05-10

## 2024-05-10 RX ORDER — LISINOPRIL 40 MG/1
40 TABLET ORAL DAILY
COMMUNITY
Start: 2024-04-01

## 2024-05-10 RX ORDER — IPRATROPIUM BROMIDE AND ALBUTEROL SULFATE 2.5; .5 MG/3ML; MG/3ML
1 SOLUTION RESPIRATORY (INHALATION)
Status: DISCONTINUED | OUTPATIENT
Start: 2024-05-10 | End: 2024-05-10

## 2024-05-10 RX ORDER — SODIUM CHLORIDE 0.9 % (FLUSH) 0.9 %
5-40 SYRINGE (ML) INJECTION PRN
Status: DISCONTINUED | OUTPATIENT
Start: 2024-05-10 | End: 2024-05-10

## 2024-05-10 RX ORDER — METOCLOPRAMIDE HYDROCHLORIDE 5 MG/ML
10 INJECTION INTRAMUSCULAR; INTRAVENOUS
Status: DISCONTINUED | OUTPATIENT
Start: 2024-05-10 | End: 2024-05-10

## 2024-05-10 RX ORDER — SCOLOPAMINE TRANSDERMAL SYSTEM 1 MG/1
1 PATCH, EXTENDED RELEASE TRANSDERMAL
Status: DISCONTINUED | OUTPATIENT
Start: 2024-05-10 | End: 2024-05-10

## 2024-05-10 RX ORDER — SODIUM CHLORIDE 0.9 % (FLUSH) 0.9 %
5-40 SYRINGE (ML) INJECTION EVERY 12 HOURS SCHEDULED
Status: DISCONTINUED | OUTPATIENT
Start: 2024-05-10 | End: 2024-05-10

## 2024-05-10 RX ORDER — FOLIC ACID 1 MG/1
1 TABLET ORAL DAILY
Status: DISCONTINUED | OUTPATIENT
Start: 2024-05-10 | End: 2024-05-11 | Stop reason: HOSPADM

## 2024-05-10 RX ORDER — FOLIC ACID 1 MG/1
1 TABLET ORAL DAILY
COMMUNITY
Start: 2024-03-25

## 2024-05-10 RX ORDER — OXYCODONE HYDROCHLORIDE 5 MG/1
10 TABLET ORAL PRN
Status: DISCONTINUED | OUTPATIENT
Start: 2024-05-10 | End: 2024-05-10

## 2024-05-10 RX ORDER — BUPROPION HYDROCHLORIDE 150 MG/1
150 TABLET ORAL EVERY MORNING
Status: DISCONTINUED | OUTPATIENT
Start: 2024-05-10 | End: 2024-05-11 | Stop reason: HOSPADM

## 2024-05-10 RX ORDER — CETIRIZINE HYDROCHLORIDE 10 MG/1
10 TABLET ORAL 2 TIMES DAILY PRN
COMMUNITY
Start: 2024-04-09

## 2024-05-10 RX ORDER — SODIUM CHLORIDE, SODIUM LACTATE, POTASSIUM CHLORIDE, CALCIUM CHLORIDE 600; 310; 30; 20 MG/100ML; MG/100ML; MG/100ML; MG/100ML
INJECTION, SOLUTION INTRAVENOUS ONCE
Status: COMPLETED | OUTPATIENT
Start: 2024-05-10 | End: 2024-05-11

## 2024-05-10 RX ORDER — ONDANSETRON 2 MG/ML
4 INJECTION INTRAMUSCULAR; INTRAVENOUS EVERY 6 HOURS PRN
Status: DISCONTINUED | OUTPATIENT
Start: 2024-05-10 | End: 2024-05-11 | Stop reason: HOSPADM

## 2024-05-10 RX ORDER — SODIUM CHLORIDE 0.9 % (FLUSH) 0.9 %
5-40 SYRINGE (ML) INJECTION PRN
Status: DISCONTINUED | OUTPATIENT
Start: 2024-05-10 | End: 2024-05-11 | Stop reason: HOSPADM

## 2024-05-10 RX ORDER — BUPIVACAINE HYDROCHLORIDE 2.5 MG/ML
INJECTION, SOLUTION EPIDURAL; INFILTRATION; INTRACAUDAL PRN
Status: DISCONTINUED | OUTPATIENT
Start: 2024-05-10 | End: 2024-05-10 | Stop reason: ALTCHOICE

## 2024-05-10 RX ORDER — HYDROMORPHONE HYDROCHLORIDE 1 MG/ML
1 INJECTION, SOLUTION INTRAMUSCULAR; INTRAVENOUS; SUBCUTANEOUS EVERY 4 HOURS PRN
Status: DISCONTINUED | OUTPATIENT
Start: 2024-05-10 | End: 2024-05-11 | Stop reason: HOSPADM

## 2024-05-10 RX ORDER — NALOXONE HYDROCHLORIDE 0.4 MG/ML
INJECTION, SOLUTION INTRAMUSCULAR; INTRAVENOUS; SUBCUTANEOUS PRN
Status: DISCONTINUED | OUTPATIENT
Start: 2024-05-10 | End: 2024-05-10

## 2024-05-10 RX ORDER — PROPOFOL 10 MG/ML
INJECTION, EMULSION INTRAVENOUS PRN
Status: DISCONTINUED | OUTPATIENT
Start: 2024-05-10 | End: 2024-05-10 | Stop reason: SDUPTHER

## 2024-05-10 RX ORDER — ACETAMINOPHEN 325 MG/1
650 TABLET ORAL EVERY 6 HOURS
Status: DISCONTINUED | OUTPATIENT
Start: 2024-05-10 | End: 2024-05-11 | Stop reason: HOSPADM

## 2024-05-10 RX ORDER — HYDRALAZINE HYDROCHLORIDE 20 MG/ML
10 INJECTION INTRAMUSCULAR; INTRAVENOUS
Status: DISCONTINUED | OUTPATIENT
Start: 2024-05-10 | End: 2024-05-10

## 2024-05-10 RX ORDER — ALBUTEROL SULFATE 90 UG/1
2 AEROSOL, METERED RESPIRATORY (INHALATION) EVERY 4 HOURS PRN
Status: DISCONTINUED | OUTPATIENT
Start: 2024-05-10 | End: 2024-05-11 | Stop reason: HOSPADM

## 2024-05-10 RX ORDER — TRAMADOL HYDROCHLORIDE 50 MG/1
50 TABLET ORAL EVERY 6 HOURS PRN
Status: DISCONTINUED | OUTPATIENT
Start: 2024-05-10 | End: 2024-05-11 | Stop reason: HOSPADM

## 2024-05-10 RX ORDER — FENTANYL CITRATE 50 UG/ML
INJECTION, SOLUTION INTRAMUSCULAR; INTRAVENOUS PRN
Status: DISCONTINUED | OUTPATIENT
Start: 2024-05-10 | End: 2024-05-10 | Stop reason: SDUPTHER

## 2024-05-10 RX ORDER — MIDAZOLAM HYDROCHLORIDE 1 MG/ML
INJECTION INTRAMUSCULAR; INTRAVENOUS PRN
Status: DISCONTINUED | OUTPATIENT
Start: 2024-05-10 | End: 2024-05-10 | Stop reason: SDUPTHER

## 2024-05-10 RX ORDER — CEFAZOLIN SODIUM 1 G/3ML
INJECTION, POWDER, FOR SOLUTION INTRAMUSCULAR; INTRAVENOUS
Status: COMPLETED
Start: 2024-05-10 | End: 2024-05-10

## 2024-05-10 RX ORDER — SODIUM CHLORIDE 9 MG/ML
INJECTION, SOLUTION INTRAVENOUS PRN
Status: DISCONTINUED | OUTPATIENT
Start: 2024-05-10 | End: 2024-05-10

## 2024-05-10 RX ORDER — LISINOPRIL 20 MG/1
40 TABLET ORAL DAILY
Status: DISCONTINUED | OUTPATIENT
Start: 2024-05-10 | End: 2024-05-11 | Stop reason: HOSPADM

## 2024-05-10 RX ORDER — 0.9 % SODIUM CHLORIDE 0.9 %
1000 INTRAVENOUS SOLUTION INTRAVENOUS ONCE
Status: COMPLETED | OUTPATIENT
Start: 2024-05-10 | End: 2024-05-10

## 2024-05-10 RX ORDER — METRONIDAZOLE 500 MG/100ML
500 INJECTION, SOLUTION INTRAVENOUS EVERY 8 HOURS
Status: COMPLETED | OUTPATIENT
Start: 2024-05-10 | End: 2024-05-11

## 2024-05-10 RX ORDER — ALPRAZOLAM 0.5 MG/1
0.5 TABLET ORAL 3 TIMES DAILY PRN
Status: DISCONTINUED | OUTPATIENT
Start: 2024-05-10 | End: 2024-05-11 | Stop reason: HOSPADM

## 2024-05-10 RX ORDER — LABETALOL HYDROCHLORIDE 5 MG/ML
10 INJECTION, SOLUTION INTRAVENOUS
Status: DISCONTINUED | OUTPATIENT
Start: 2024-05-10 | End: 2024-05-10

## 2024-05-10 RX ORDER — MEPERIDINE HYDROCHLORIDE 25 MG/ML
12.5 INJECTION INTRAMUSCULAR; INTRAVENOUS; SUBCUTANEOUS EVERY 5 MIN PRN
Status: DISCONTINUED | OUTPATIENT
Start: 2024-05-10 | End: 2024-05-10

## 2024-05-10 RX ADMIN — CEFTRIAXONE SODIUM 1000 MG: 1 INJECTION, POWDER, FOR SOLUTION INTRAMUSCULAR; INTRAVENOUS at 06:35

## 2024-05-10 RX ADMIN — MIDAZOLAM HYDROCHLORIDE 2 MG: 2 INJECTION, SOLUTION INTRAMUSCULAR; INTRAVENOUS at 12:49

## 2024-05-10 RX ADMIN — BUPROPION HYDROCHLORIDE 150 MG: 150 TABLET, FILM COATED, EXTENDED RELEASE ORAL at 08:09

## 2024-05-10 RX ADMIN — SODIUM CHLORIDE, PRESERVATIVE FREE 10 ML: 5 INJECTION INTRAVENOUS at 20:10

## 2024-05-10 RX ADMIN — LISINOPRIL 40 MG: 20 TABLET ORAL at 08:09

## 2024-05-10 RX ADMIN — ALPRAZOLAM 0.5 MG: 0.5 TABLET ORAL at 03:38

## 2024-05-10 RX ADMIN — METRONIDAZOLE 500 MG: 500 INJECTION, SOLUTION INTRAVENOUS at 10:59

## 2024-05-10 RX ADMIN — METRONIDAZOLE 500 MG: 500 INJECTION, SOLUTION INTRAVENOUS at 17:55

## 2024-05-10 RX ADMIN — FENTANYL CITRATE 50 MCG: 50 INJECTION, SOLUTION INTRAMUSCULAR; INTRAVENOUS at 14:19

## 2024-05-10 RX ADMIN — ROCURONIUM BROMIDE 20 MG: 10 INJECTION, SOLUTION INTRAVENOUS at 13:06

## 2024-05-10 RX ADMIN — FENTANYL CITRATE 50 MCG: 50 INJECTION, SOLUTION INTRAMUSCULAR; INTRAVENOUS at 13:34

## 2024-05-10 RX ADMIN — ACETAMINOPHEN 650 MG: 325 TABLET ORAL at 15:35

## 2024-05-10 RX ADMIN — SODIUM CHLORIDE, POTASSIUM CHLORIDE, SODIUM LACTATE AND CALCIUM CHLORIDE: 600; 310; 30; 20 INJECTION, SOLUTION INTRAVENOUS at 11:01

## 2024-05-10 RX ADMIN — SODIUM CHLORIDE 1000 ML: 9 INJECTION, SOLUTION INTRAVENOUS at 00:28

## 2024-05-10 RX ADMIN — ONDANSETRON 4 MG: 2 INJECTION INTRAMUSCULAR; INTRAVENOUS at 13:00

## 2024-05-10 RX ADMIN — HYDROMORPHONE HYDROCHLORIDE 1 MG: 1 INJECTION, SOLUTION INTRAMUSCULAR; INTRAVENOUS; SUBCUTANEOUS at 23:00

## 2024-05-10 RX ADMIN — HYDROMORPHONE HYDROCHLORIDE 1 MG: 1 INJECTION, SOLUTION INTRAMUSCULAR; INTRAVENOUS; SUBCUTANEOUS at 15:34

## 2024-05-10 RX ADMIN — LIDOCAINE HYDROCHLORIDE 100 MG: 20 INJECTION, SOLUTION EPIDURAL; INFILTRATION; INTRACAUDAL; PERINEURAL at 12:53

## 2024-05-10 RX ADMIN — PHENYLEPHRINE HYDROCHLORIDE 100 MCG: 10 INJECTION INTRAVENOUS at 13:12

## 2024-05-10 RX ADMIN — DEXAMETHASONE SODIUM PHOSPHATE 4 MG: 4 INJECTION, SOLUTION INTRA-ARTICULAR; INTRALESIONAL; INTRAMUSCULAR; INTRAVENOUS; SOFT TISSUE at 13:00

## 2024-05-10 RX ADMIN — SODIUM CHLORIDE, POTASSIUM CHLORIDE, SODIUM LACTATE AND CALCIUM CHLORIDE: 600; 310; 30; 20 INJECTION, SOLUTION INTRAVENOUS at 14:46

## 2024-05-10 RX ADMIN — SODIUM CHLORIDE, POTASSIUM CHLORIDE, SODIUM LACTATE AND CALCIUM CHLORIDE: 600; 310; 30; 20 INJECTION, SOLUTION INTRAVENOUS at 03:07

## 2024-05-10 RX ADMIN — ROCURONIUM BROMIDE 10 MG: 10 INJECTION, SOLUTION INTRAVENOUS at 12:53

## 2024-05-10 RX ADMIN — FOLIC ACID 1 MG: 1 TABLET ORAL at 08:09

## 2024-05-10 RX ADMIN — FENTANYL CITRATE 50 MCG: 50 INJECTION, SOLUTION INTRAMUSCULAR; INTRAVENOUS at 12:49

## 2024-05-10 RX ADMIN — Medication 140 MG: at 12:53

## 2024-05-10 RX ADMIN — PANTOPRAZOLE SODIUM 40 MG: 40 TABLET, DELAYED RELEASE ORAL at 08:09

## 2024-05-10 RX ADMIN — SODIUM CHLORIDE, POTASSIUM CHLORIDE, SODIUM LACTATE AND CALCIUM CHLORIDE: 600; 310; 30; 20 INJECTION, SOLUTION INTRAVENOUS at 12:43

## 2024-05-10 RX ADMIN — ACETAMINOPHEN 650 MG: 325 TABLET ORAL at 20:09

## 2024-05-10 RX ADMIN — CEFAZOLIN SODIUM 2000 MG: 1 INJECTION, POWDER, FOR SOLUTION INTRAMUSCULAR; INTRAVENOUS at 12:42

## 2024-05-10 RX ADMIN — CEFAZOLIN SODIUM: 1 INJECTION, POWDER, FOR SOLUTION INTRAMUSCULAR; INTRAVENOUS at 14:40

## 2024-05-10 RX ADMIN — SODIUM CHLORIDE, PRESERVATIVE FREE 10 ML: 5 INJECTION INTRAVENOUS at 08:10

## 2024-05-10 RX ADMIN — PROPOFOL 150 MG: 10 INJECTION, EMULSION INTRAVENOUS at 12:53

## 2024-05-10 RX ADMIN — HYDROMORPHONE HYDROCHLORIDE 1 MG: 1 INJECTION, SOLUTION INTRAMUSCULAR; INTRAVENOUS; SUBCUTANEOUS at 08:10

## 2024-05-10 RX ADMIN — CEFTRIAXONE SODIUM 1000 MG: 1 INJECTION, POWDER, FOR SOLUTION INTRAMUSCULAR; INTRAVENOUS at 17:56

## 2024-05-10 RX ADMIN — SUGAMMADEX 200 MG: 100 INJECTION, SOLUTION INTRAVENOUS at 13:32

## 2024-05-10 RX ADMIN — Medication 1 LOZENGE: at 17:54

## 2024-05-10 RX ADMIN — METRONIDAZOLE 500 MG: 500 INJECTION, SOLUTION INTRAVENOUS at 03:06

## 2024-05-10 ASSESSMENT — PAIN SCALES - GENERAL
PAINLEVEL_OUTOF10: 9
PAINLEVEL_OUTOF10: 0
PAINLEVEL_OUTOF10: 0
PAINLEVEL_OUTOF10: 8
PAINLEVEL_OUTOF10: 7
PAINLEVEL_OUTOF10: 0
PAINLEVEL_OUTOF10: 4
PAINLEVEL_OUTOF10: 4
PAINLEVEL_OUTOF10: 8
PAINLEVEL_OUTOF10: 9
PAINLEVEL_OUTOF10: 0
PAINLEVEL_OUTOF10: 8
PAINLEVEL_OUTOF10: 0
PAINLEVEL_OUTOF10: 6
PAINLEVEL_OUTOF10: 4
PAINLEVEL_OUTOF10: 5
PAINLEVEL_OUTOF10: 0

## 2024-05-10 ASSESSMENT — PAIN DESCRIPTION - ORIENTATION
ORIENTATION: ANTERIOR
ORIENTATION: ANTERIOR
ORIENTATION: UPPER
ORIENTATION: ANTERIOR
ORIENTATION: UPPER

## 2024-05-10 ASSESSMENT — ENCOUNTER SYMPTOMS
NAUSEA: 0
DIARRHEA: 0
EYE REDNESS: 0
CONSTIPATION: 0
SHORTNESS OF BREATH: 0
VOMITING: 0
COUGH: 0
WHEEZING: 0
SORE THROAT: 0
BACK PAIN: 0
ABDOMINAL PAIN: 1

## 2024-05-10 ASSESSMENT — PAIN - FUNCTIONAL ASSESSMENT
PAIN_FUNCTIONAL_ASSESSMENT: ACTIVITIES ARE NOT PREVENTED
PAIN_FUNCTIONAL_ASSESSMENT: ACTIVITIES ARE NOT PREVENTED
PAIN_FUNCTIONAL_ASSESSMENT: 0-10
PAIN_FUNCTIONAL_ASSESSMENT: ACTIVITIES ARE NOT PREVENTED
PAIN_FUNCTIONAL_ASSESSMENT: ACTIVITIES ARE NOT PREVENTED

## 2024-05-10 ASSESSMENT — PAIN DESCRIPTION - LOCATION
LOCATION: ABDOMEN
LOCATION: THROAT
LOCATION: ABDOMEN

## 2024-05-10 ASSESSMENT — PAIN DESCRIPTION - DESCRIPTORS
DESCRIPTORS: ACHING
DESCRIPTORS: ACHING;DISCOMFORT
DESCRIPTORS: SORE
DESCRIPTORS: ACHING
DESCRIPTORS: ACHING
DESCRIPTORS: ACHING;DISCOMFORT

## 2024-05-10 ASSESSMENT — LIFESTYLE VARIABLES
HOW MANY STANDARD DRINKS CONTAINING ALCOHOL DO YOU HAVE ON A TYPICAL DAY: PATIENT DOES NOT DRINK
HOW OFTEN DO YOU HAVE A DRINK CONTAINING ALCOHOL: NEVER

## 2024-05-10 ASSESSMENT — PAIN SCALES - WONG BAKER: WONGBAKER_NUMERICALRESPONSE: HURTS LITTLE MORE

## 2024-05-10 NOTE — PROGRESS NOTES
4 Eyes Skin Assessment     NAME:  Chichi Ocasio  YOB: 1960  MEDICAL RECORD NUMBER:  [id number]    The patient is being assessed for  Post-Op Surgical    I agree that at least one RN has performed a thorough Head to Toe Skin Assessment on the patient. ALL assessment sites listed below have been assessed.      Areas assessed by both nurses:    Head, Face, Ears, Shoulders, Back, Chest, Arms, Elbows, Hands, Sacrum. Buttock, Coccyx, Ischium, Legs. Feet and Heels, and Under Medical Devices         Does the Patient have a Wound? Yes wound(s) were present on assessment. LDA wound assessment was Initiated and completed by RN       Ray Prevention initiated by RN: No  Wound Care Orders initiated by RN: No    Pressure Injury (Stage 3,4, Unstageable, DTI, NWPT, and Complex wounds) if present, place Wound referral order by RN under : No    New Ostomies, if present place, Ostomy referral order under : No     Nurse 1 eSignature: Electronically signed by Leigha Grewal RN on 5/10/24 at 2:58 PM EDT    **SHARE this note so that the co-signing nurse can place an eSignature**    Nurse 2 eSignature: Electronically signed by ROBINSON URIARTE LPN on 5/10/24 at 3:00 PM EDT

## 2024-05-10 NOTE — PROGRESS NOTES
4 Eyes Skin Assessment     NAME:  Chichi Ocasio  YOB: 1960  MEDICAL RECORD NUMBER:  [id number]    The patient is being assessed for  Admission    I agree that at least one RN has performed a thorough Head to Toe Skin Assessment on the patient. ALL assessment sites listed below have been assessed.      Areas assessed by both nurses:    Head, Face, Ears, Shoulders, Back, Chest, Arms, Elbows, Hands, Sacrum. Buttock, Coccyx, Ischium, Legs. Feet and Heels, and Under Medical Devices         Does the Patient have a Wound? No noted wound(s)       Ray Prevention initiated by RN: Yes  Wound Care Orders initiated by RN: No    Pressure Injury (Stage 3,4, Unstageable, DTI, NWPT, and Complex wounds) if present, place Wound referral order by RN under : No    New Ostomies, if present place, Ostomy referral order under : No     Nurse 1 eSignature: Electronically signed by JETHRO AKHTAR RN on 5/10/24 at 5:43 AM EDT    **SHARE this note so that the co-signing nurse can place an eSignature**    Nurse 2 eSignature: Electronically signed by Vita Reese RN on 5/10/24 at 6:01 AM EDT

## 2024-05-10 NOTE — BRIEF OP NOTE
Brief Postoperative Note      Patient: Chichi Ocasio  YOB: 1960  MRN: [id number]    Date of Procedure: 5/10/2024    Pre-Op Diagnosis Codes:     * Acute cholecystitis [K81.0]    Post-Op Diagnosis: Same       Procedure(s):  CHOLECYSTECTOMY LAPAROSCOPIC    Surgeon(s):  Helen Hodgson DO    Assistant:  Surgical Assistant: Darius Tyler    Anesthesia: General    Estimated Blood Loss (mL): Minimal    Complications: None    Specimens:   ID Type Source Tests Collected by Time Destination   1 : gallbladder Tissue Gallbladder SURGICAL PATHOLOGY Helen Hodgson DO 5/10/2024 1325        Implants:  * No implants in log *      Drains: * No LDAs found *    Findings:  Infection Present At Time Of Surgery (PATOS) (choose all levels that have infection present):  - Organ Space infection (below fascia) present as evidenced by purulent fluid  Other Findings: edematous gallbladder with small focus of purulent fluid    Electronically signed by Helen Hodgson DO on 5/10/2024 at 1:33 PM

## 2024-05-10 NOTE — ED NOTES
TRANSFER - OUT REPORT:    Verbal report given to TYRESE Herman RN on Chichi Ocasio  being transferred to [facility] ER for ordered procedure       Report consisted of patient's Situation, Background, Assessment and   Recommendations(SBAR).     Information from the following report(s) ED Encounter Summary, ED SBAR, MAR, and Recent Results was reviewed with the receiving nurse.    Kinder Fall Assessment:                           Lines:   Peripheral IV 05/09/24 Right Antecubital (Active)        Opportunity for questions and clarification was provided.      Patient transported with:  O2 @ 2lpm and Tech

## 2024-05-10 NOTE — ED PROVIDER NOTES
[facility]  EMERGENCY DEPARTMENT ENCOUNTER NOTE    Date: 5/10/2024  Patient Name: Chichi Ocasio    History of Presenting Illness     Chief Complaint   Patient presents with    Abdominal Pain    Transfer of Care       History obtained from: Patient and EMS    HPI: Chichi Ocasio, 63 y.o. female with past medical history as listed and reviewed below presents for acute cholecystitis and ductal obstruction.  The patient presented to an outside facility for abdominal pain.  Exam showed abdominal tenderness and workup showed a normal CBC, normal LFTs with a normal bilirubin, unremarkable BMP a CT that showed gallbladder distention with intrahepatic and extrahepatic biliary duct dilation concerning for acute cholecystitis and potential early ductal obstruction.  Cultures were sent, ceftriaxone and Flagyl were given.  The patient was transferred for escalation of care and definitive care.    Medical History   I reviewed the medical, surgical, family, and social history, as well as allergies:    PCP: Tawanda Sevilla MD    Past Medical History:  Past Medical History:   Diagnosis Date    Anxiety     Chronic sinusitis     Endocrine disorder     GERD (gastroesophageal reflux disease)     History of recurrent UTIs     Hypertension     Left arm pain     Low back pain     Menopause     Myeloradiculopathy     Neck pain     Renal cyst      Past Surgical History:  Past Surgical History:   Procedure Laterality Date    HEMORRHOID SURGERY      HYSTERECTOMY (CERVIX STATUS UNKNOWN)  1995?    Total    LUMBAR FUSION      LUMBAR LAMINECTOMY      OTHER SURGICAL HISTORY      foot surgery    OTHER SURGICAL HISTORY      Cervical dempression    OTHER SURGICAL HISTORY      cervical fusion    OTHER SURGICAL HISTORY      nasal surgery    OVARY REMOVAL       Current Outpatient Medications:  Current Outpatient Medications   Medication Instructions    acetaminophen (TYLENOL) 325 MG tablet Oral, EVERY 4 HOURS  Previous Radiology Studies  Social Determinants of health affecting management: None    ED Course & Reassessment     ED Course:          Reassessment:    Will admit.    Diagnosis     Clinical Impression:   1. Cholecystitis    2. Intrahepatic bile duct dilation        Final Disposition     Admission: ADMITTED TO HOSPITAL    After completion of ED workup and discussion of results and diagnoses, patient was admitted to the hospital. Case was discussed with admitting provider.    Procedures, Critical Care, & Clinical Tools   Performed by: Faraz Doshi MD  Procedures     CRITICAL CARE DOCUMENTATION  NOT MET: Critical care billing criteria and/or time were NOT met.    SEPSIS REASSESSMENT NOTE  Sepsis reassessment note not applicable.    HEART SCORE  Heart score not applicable: no chest pain .     Results, Consults, Medications     Consults:  None   Labs:  Recent Results (from the past 12 hour(s))   CBC with Auto Differential    Collection Time: 05/09/24  3:25 PM   Result Value Ref Range    WBC 9.3 4.6 - 13.2 K/uL    RBC 3.92 (L) 4.20 - 5.30 M/uL    Hemoglobin 12.0 12.0 - 16.0 g/dL    Hematocrit 36.3 35.0 - 45.0 %    MCV 92.6 78.0 - 100.0 FL    MCH 30.6 24.0 - 34.0 PG    MCHC 33.1 31.0 - 37.0 g/dL    RDW 13.2 11.6 - 14.5 %    Platelets 269 135 - 420 K/uL    MPV 11.0 9.2 - 11.8 FL    Nucleated RBCs 0.0 0.0  WBC    nRBC 0.00 0.00 - 0.01 K/uL    Neutrophils % 66 40 - 73 %    Lymphocytes % 24 21 - 52 %    Monocytes % 7 3 - 10 %    Eosinophils % 2 0 - 5 %    Basophils % 1 0 - 2 %    Immature Granulocytes % 0 0 - 0.5 %    Neutrophils Absolute 6.1 1.8 - 8.0 K/UL    Lymphocytes Absolute 2.2 0.9 - 3.6 K/UL    Monocytes Absolute 0.7 0.05 - 1.2 K/UL    Eosinophils Absolute 0.2 0.0 - 0.4 K/UL    Basophils Absolute 0.1 0.0 - 0.1 K/UL    Immature Granulocytes Absolute 0.0 0.00 - 0.04 K/UL    Differential Type AUTOMATED     Basic Metabolic Panel    Collection Time: 05/09/24  3:25 PM   Result Value Ref Range    Sodium 144 136 - 145  sheets (if patient was EMS transported) were reviewed    Please note that this dictation was completed with [employer], the computer voice recognition software.  Quite often unanticipated grammatical, syntax, homophones, and other interpretive errors are inadvertently transcribed by the computer software.  Please disregard these errors.  Please excuse any errors that have escaped final proofreading.     Faraz Doshi MD  05/10/24 0049

## 2024-05-10 NOTE — ED NOTES
Pt. States her pain is increasing and she is beginning to have anxiety. Pt. States she takes PO meds at home for anxiety. MD is aware.    none

## 2024-05-10 NOTE — PLAN OF CARE
Problem: Discharge Planning  Goal: Discharge to home or other facility with appropriate resources  Outcome: Progressing  Flowsheets (Taken 5/10/2024 0300)  Discharge to home or other facility with appropriate resources:   Identify barriers to discharge with patient and caregiver   Arrange for needed discharge resources and transportation as appropriate     Problem: Pain  Goal: Verbalizes/displays adequate comfort level or baseline comfort level  Outcome: Progressing  Flowsheets (Taken 5/10/2024 0300)  Verbalizes/displays adequate comfort level or baseline comfort level:   Encourage patient to monitor pain and request assistance   Assess pain using appropriate pain scale   Administer analgesics based on type and severity of pain and evaluate response   Implement non-pharmacological measures as appropriate and evaluate response   Consider cultural and social influences on pain and pain management   Notify Licensed Independent Practitioner if interventions unsuccessful or patient reports new pain     Problem: Safety - Adult  Goal: Free from fall injury  Outcome: Progressing

## 2024-05-10 NOTE — PROGRESS NOTES
Hospitalist Progress Note    NAME:   Chichi Ocasio   : 1960   MRN: [id number]     Date/Time: 5/10/2024 8:39 AM  Patient PCP: Tawanda Sevilla MD    Estimated discharge date: 24 to 48 hours  Barriers: Cholecystectomy-surgery clearance, GI evaluation    Hospital Course:  Chichi Ocasio is a 63-year-old female with hypertension, anxiety, s/p hysterectomy on estrogen supplementation admitted 5/10/2024 for cholecystitis.  CT abdomen pelvis shows gallbladder distention with intrahepatic and extrahepatic biliary ductal dilation concerning for acute cholecystitis and potential early ductal obstruction.  General surgery consulted, proceeding with cholecystectomy .  GI consulted.    Assessment / Plan:  Acute cholecystitis  -CT abdomen suggest gallbladder distention with intrahepatic extrahepatic biliary ductal dilatation.  No mention of cholelithiasis.    -Started on ceftriaxone and metronidazole   -general surgery consulted, will proceed with cholecystectomy 5/10/2024  -GI consulted     Essential hypertension:   -Will continue lisinopril with holding parameters.     Anxiety:   -On as needed alprazolam,   -continue Wellbutrin    S/p hysterectomy  She is on estrogen supplementation, held due to increased risk of thromboembolism especially with requirement of surgery.  She can start back on once she is recovered   Medical Decision Making:   [] High (any 2)    A. Problems (any 1)  [x] Acute/Chronic Illness/injury posing threat to life or bodily function: Cholecystitis  [] Severe exacerbation of chronic illness:    ---------------------------------------------------------------------  B. Risk of Treatment (any 1)   [] Drugs/treatments that require intensive monitoring for toxicity include:    [] IV ABX requiring serial renal monitoring for nephrotoxicity:     [] IV Narcotic analgesia for adverse drug reaction  [] Aggressive IV diuresis requiring serial monitoring for renal impairment and electrolyte  138/71 -- -- 66 14 99 % -- --   05/10/24 0017 (!) 117/93 98.1 °F (36.7 °C) Oral 73 13 100 % 1.676 m (5' 6\") 66.2 kg (146 lb)         Intake/Output Summary (Last 24 hours) at 5/10/2024 0884  Last data filed at 5/10/2024 0423  Gross per 24 hour   Intake 1100 ml   Output --   Net 1100 ml        I had a face to face encounter and independently examined this patient on 5/10/2024, as outlined below:    Review of Systems   Constitutional:  Negative for diaphoresis, fatigue and fever.   Respiratory:  Negative for cough and shortness of breath.    Cardiovascular:  Negative for chest pain and leg swelling.   Gastrointestinal:  Positive for abdominal pain. Negative for constipation, diarrhea, nausea and vomiting.   Genitourinary:  Negative for difficulty urinating and dysuria.   Neurological:  Negative for dizziness, weakness and light-headedness.   Psychiatric/Behavioral:  Negative for confusion.         PHYSICAL EXAM:  Physical Exam  Constitutional:       Appearance: She is not ill-appearing.   Eyes:      Extraocular Movements: Extraocular movements intact.   Cardiovascular:      Rate and Rhythm: Normal rate and regular rhythm.   Pulmonary:      Effort: Pulmonary effort is normal.      Breath sounds: Normal breath sounds.   Abdominal:      Palpations: Abdomen is soft.      Tenderness: There is abdominal tenderness (RUQ).   Skin:     General: Skin is warm.   Neurological:      General: No focal deficit present.      Mental Status: She is alert and oriented to person, place, and time.          Reviewed most current lab test results and cultures  YES  Reviewed most current radiology test results   YES  Review and summation of old records today    NO  Reviewed patient's current orders and MAR    YES  PMH/SH reviewed - no change compared to H&P  ________________________________________________________________________  Care Plan discussed with:    Comments   Patient x    Family      RN x    Care Manager     Consultant

## 2024-05-10 NOTE — ED NOTES
Assumed care of patient from Dr. Rodriguez.  Patient pending transfer to facility with ERCP capabilities.  I reviewed the chart and laboratory results and CT scan.  I went to see patient who was complaining of pain coming back.  She also takes anxiety medication and she is feeling anxious.  I had been told transfer there was checking for facility with ERCP capabilities and had checked with [facility] and I think [facility] facilities with no availability.  I discussed with transfer center and were able to place me to [place] and I discussed with Dr. Francisco Dickey who accepted patient to the ED.     Danielito Alfonso MD  05/10/24 3667

## 2024-05-10 NOTE — ANESTHESIA PRE PROCEDURE
History:        Procedure Laterality Date    HEMORRHOID SURGERY      HYSTERECTOMY (CERVIX STATUS UNKNOWN)  1995?    Total    LUMBAR FUSION      LUMBAR LAMINECTOMY      OTHER SURGICAL HISTORY      foot surgery    OTHER SURGICAL HISTORY      Cervical dempression    OTHER SURGICAL HISTORY      cervical fusion    OTHER SURGICAL HISTORY      nasal surgery    OVARY REMOVAL         Social History:    Social History     Tobacco Use    Smoking status: Former    Smokeless tobacco: Never    Tobacco comments:     Quit smoking: Quit in 2012   Substance Use Topics    Alcohol use: No                                Counseling given: Not Answered  Tobacco comments: Quit smoking: Quit in 2012      Vital Signs (Current):   Vitals:    05/10/24 0130 05/10/24 0212 05/10/24 0815 05/10/24 0840   BP: 126/60 (!) 143/62 116/61    Pulse: 57 60 58    Resp: 13 16 17 16   Temp: 97.8 °F (36.6 °C) 98 °F (36.7 °C) 98.2 °F (36.8 °C)    TempSrc: Oral Oral Oral    SpO2: 100% 100% 95%    Weight:       Height:                                                  BP Readings from Last 3 Encounters:   05/10/24 116/61   05/09/24 132/63       NPO Status:                                                                                 BMI:   Wt Readings from Last 3 Encounters:   05/10/24 66.2 kg (146 lb)   05/09/24 66.2 kg (146 lb)     Body mass index is 23.57 kg/m².    CBC:   Lab Results   Component Value Date/Time    WBC 11.5 05/10/2024 12:26 AM    RBC 3.60 05/10/2024 12:26 AM    HGB 10.7 05/10/2024 12:26 AM    HCT 33.5 05/10/2024 12:26 AM    MCV 93.1 05/10/2024 12:26 AM    RDW 13.1 05/10/2024 12:26 AM     05/10/2024 12:26 AM       CMP:   Lab Results   Component Value Date/Time     05/10/2024 12:27 AM    K 4.0 05/10/2024 12:27 AM     05/10/2024 12:27 AM    CO2 27 05/10/2024 12:27 AM    BUN 8 05/10/2024 12:27 AM    CREATININE 0.88 05/10/2024 12:27 AM    LABGLOM 73 05/10/2024 12:27 AM    LABGLOM 55 12/20/2023 10:16 AM    GLUCOSE 102 05/10/2024

## 2024-05-10 NOTE — CONSULTS
Surgery Consult    Patient: Chichi Ocasio MRN: [id number]  SSN: [id number]    YOB: 1960  Age: 63 y.o.  Sex: female      Subjective:      Chichi Ocasio is a 63 y.o. female who is being seen for abdominal pain.  Patient states the pain began yesterday morning and worsened acutely throughout the day.  She states the pain is located in the epigastric and right upper quadrant area.  She denies any nausea or vomiting.  She denies any previous episodes of pain similar to this.  She has a history of a hysterectomy but no other abdominal operations.  She has no significant cardiac history and does not take any blood thinners.    Past Medical History:   Diagnosis Date    Anxiety     Chronic sinusitis     Endocrine disorder     GERD (gastroesophageal reflux disease)     History of recurrent UTIs     Hypertension     Left arm pain     Low back pain     Menopause     Myeloradiculopathy     Neck pain     Renal cyst      Past Surgical History:   Procedure Laterality Date    HEMORRHOID SURGERY      HYSTERECTOMY (CERVIX STATUS UNKNOWN)  1995?    Total    LUMBAR FUSION      LUMBAR LAMINECTOMY      OTHER SURGICAL HISTORY      foot surgery    OTHER SURGICAL HISTORY      Cervical dempression    OTHER SURGICAL HISTORY      cervical fusion    OTHER SURGICAL HISTORY      nasal surgery    OVARY REMOVAL        Family History   Problem Relation Age of Onset    COPD Father     Hypertension Mother     Breast Cancer Maternal Aunt      Social History     Tobacco Use    Smoking status: Former    Smokeless tobacco: Never    Tobacco comments:     Quit smoking: Quit in 2012   Substance Use Topics    Alcohol use: No    Drug use: No      Current Facility-Administered Medications   Medication Dose Route Frequency Provider Last Rate Last Admin    albuterol sulfate HFA (PROVENTIL;VENTOLIN;PROAIR) 108 (90 Base) MCG/ACT inhaler 2 puff  2 puff Inhalation Q4H PRN Vibha Hsu MD        ALPRAZolam (XANAX) tablet 0.5 mg  0.5 mg Oral  TID PRN Vibha Hsu MD   0.5 mg at 05/10/24 0338    buPROPion (WELLBUTRIN XL) extended release tablet 150 mg  150 mg Oral QAM Vibha Hsu MD   150 mg at 05/10/24 0809    pantoprazole (PROTONIX) tablet 40 mg  40 mg Oral Daily Vibha Hsu MD   40 mg at 05/10/24 0809    folic acid (FOLVITE) tablet 1 mg  1 mg Oral Daily Vibha Hsu MD   1 mg at 05/10/24 0809    lisinopril (PRINIVIL;ZESTRIL) tablet 40 mg  40 mg Oral Daily Vibha Hsu MD   40 mg at 05/10/24 0809    sodium chloride flush 0.9 % injection 5-40 mL  5-40 mL IntraVENous 2 times per day Vibha Hsu MD   10 mL at 05/10/24 0810    sodium chloride flush 0.9 % injection 5-40 mL  5-40 mL IntraVENous PRN Vibha Hsu MD        0.9 % sodium chloride infusion   IntraVENous PRN Vibha Hsu MD        enoxaparin (LOVENOX) injection 40 mg  40 mg SubCUTAneous Daily Vibha Hsu MD        ondansetron (ZOFRAN-ODT) disintegrating tablet 4 mg  4 mg Oral Q8H PRN Vibha Hsu MD        Or    ondansetron (ZOFRAN) injection 4 mg  4 mg IntraVENous Q6H PRN Vibha Hsu MD        lactated ringers IV soln infusion   IntraVENous Continuous Vibha Hsu  mL/hr at 05/10/24 0307 New Bag at 05/10/24 0307    cefTRIAXone (ROCEPHIN) 1,000 mg in sterile water 10 mL IV syringe  1,000 mg IntraVENous Q12H Vibha Hsu MD   1,000 mg at 05/10/24 0635    metroNIDAZOLE (FLAGYL) 500 mg in 0.9% NaCl 100 mL IVPB premix  500 mg IntraVENous Q8H Vibha Hsu MD   Stopped at 05/10/24 0423    HYDROmorphone HCl PF (DILAUDID) injection 1 mg  1 mg IntraVENous Q4H PRN Vibha Hsu MD   1 mg at 05/10/24 0810        Allergies   Allergen Reactions    Aspirin      Other reaction(s): Unknown (comments)    Ciprofloxacin      Other reaction(s): Drowsiness    Citalopram      Other reaction(s): Unknown (comments)    Clarithromycin      Other reaction(s):

## 2024-05-10 NOTE — PLAN OF CARE
Problem: Discharge Planning  Goal: Discharge to home or other facility with appropriate resources  5/10/2024 1953 by Araceli Rodriguez RN  Outcome: Progressing  5/10/2024 1032 by Leigha Grewal RN  Outcome: Progressing     Problem: Pain  Goal: Verbalizes/displays adequate comfort level or baseline comfort level  5/10/2024 1953 by Araceli Rodriguez RN  Outcome: Progressing  Flowsheets (Taken 5/10/2024 1913)  Verbalizes/displays adequate comfort level or baseline comfort level:   Encourage patient to monitor pain and request assistance   Assess pain using appropriate pain scale   Administer analgesics based on type and severity of pain and evaluate response  5/10/2024 1032 by Leigha Grewal RN  Outcome: Progressing     Problem: Safety - Adult  Goal: Free from fall injury  5/10/2024 1953 by Araceli Rodriguez RN  Outcome: Progressing  5/10/2024 1032 by Leigha Grewal RN  Outcome: Progressing

## 2024-05-10 NOTE — ED TRIAGE NOTES
Pt arrives via [facility] from [facility] for RUQ abd pain that started yesterday morning, nausea and vomiting worsening with eating.

## 2024-05-10 NOTE — PLAN OF CARE
Problem: Discharge Planning  Goal: Discharge to home or other facility with appropriate resources  5/10/2024 1032 by Leigha Grewal RN  Outcome: Progressing  5/10/2024 0552 by Ruth Cobian RN  Outcome: Progressing  Flowsheets (Taken 5/10/2024 0300)  Discharge to home or other facility with appropriate resources:   Identify barriers to discharge with patient and caregiver   Arrange for needed discharge resources and transportation as appropriate     Problem: Pain  Goal: Verbalizes/displays adequate comfort level or baseline comfort level  5/10/2024 1032 by Leigha Grewal RN  Outcome: Progressing  5/10/2024 0552 by Ruth Cobian RN  Outcome: Progressing  Flowsheets (Taken 5/10/2024 0300)  Verbalizes/displays adequate comfort level or baseline comfort level:   Encourage patient to monitor pain and request assistance   Assess pain using appropriate pain scale   Administer analgesics based on type and severity of pain and evaluate response   Implement non-pharmacological measures as appropriate and evaluate response   Consider cultural and social influences on pain and pain management   Notify Licensed Independent Practitioner if interventions unsuccessful or patient reports new pain

## 2024-05-10 NOTE — ANESTHESIA POSTPROCEDURE EVALUATION
Department of Anesthesiology  Postprocedure Note    Patient: Chichi Ocasio  MRN: [id number]  YOB: 1960  Date of evaluation: 5/10/2024    Procedure Summary       Date: 05/10/24 Room / Location: [facility] MAIN OR 05 / [facility] MAIN OR    Anesthesia Start: 1246 Anesthesia Stop: 1402    Procedure: CHOLECYSTECTOMY LAPAROSCOPIC (Abdomen) Diagnosis:       Acute cholecystitis      (Acute cholecystitis [K81.0])    Surgeons: Helen Hodgson DO Responsible Provider: Amarjit Brewster MD    Anesthesia Type: General ASA Status: 2            Anesthesia Type: General    Miriam Phase I: Miriam Score: 9    Miriam Phase II:      Anesthesia Post Evaluation    Patient location during evaluation: PACU  Patient participation: complete - patient participated  Level of consciousness: sleepy but conscious  Pain score: 0  Airway patency: patent  Nausea & Vomiting: no nausea and no vomiting  Cardiovascular status: hemodynamically stable  Respiratory status: acceptable  Hydration status: stable  Multimodal analgesia pain management approach    No notable events documented.

## 2024-05-10 NOTE — PROGRESS NOTES
Patient transferred to 528 via bed in stable condition.  Bedside report given, SBAR reviewed, sites viewed. Patients daughter is at the bedside.

## 2024-05-10 NOTE — OP NOTE
Operative Note      Patient: Chichi Ocasio  YOB: 1960  MRN: [id number]    Date of Procedure: 5/10/2024    Pre-Op Diagnosis Codes:     * Acute cholecystitis [K81.0]    Post-Op Diagnosis: Same       Procedure(s):  CHOLECYSTECTOMY LAPAROSCOPIC    Surgeon(s):  Helen Hodgson DO    Assistant:   Surgical Assistant: Darius Tyler    Anesthesia: General    Estimated Blood Loss (mL): Minimal    Complications: None    Specimens:   ID Type Source Tests Collected by Time Destination   1 : gallbladder Tissue Gallbladder SURGICAL PATHOLOGY Helen Hodgson DO 5/10/2024 1325        Implants:  * No implants in log *      Drains: * No LDAs found *    Findings:  Infection Present At Time Of Surgery (PATOS) (choose all levels that have infection present):  - Organ Space infection (below fascia) present as evidenced by purulent fluid  Other Findings: edematous gallbladder with small focus of purulent fluid    Indications for Procedure:  Patient is a 63 y.o. female who presented with signs consistent with acute cholecystitis. She was offered laparoscopic cholecystectomy as treatment of her condition. Risks and benefits of the procedure were explained to the patient in detail, informed consent was obtained and placed in the chart.    Description of Procedure:  Patient was brought back to the operating suite and placed in a supine position on the operating room table.  General anesthesia was performed and preoperative antibiotics were given.  The abdomen was prepped and draped in the typical sterile fashion.  A time out was performed confirming correct patient and correct procedure, all present were in agreement.  A 5 mm optiview trocar was used to enter the abdomen under direct visualization and diagnotic laparoscopy was performed.  A 12 mm trochar was then inserted subxiphoid, followed by two additional 5 mm trochars in the right subcostal region.  A fundus grasper was used to retract the gallbladder toward the

## 2024-05-10 NOTE — ED NOTES
ED TO INPATIENT SBAR HANDOFF    Patient Name: Chichi Ocasio   Preferred Name: Chichi  : 1960  63 y.o.   Family/Caregiver Present: no   Code Status Order: Full Code  PO Status: NPO:Yes  Telemetry Order:   C-SSRS: Risk of Suicide: No Risk  Sitter no   Restraints:     Sepsis Risk Score Sepsis Risk Score: 1.91    Situation  Chief Complaint   Patient presents with    Abdominal Pain    Transfer of Care     Brief Description of Patient's Condition: Transfer from [facility] for RUQ abd pain that started yesterday morning, nausea and vomiting worsening with eating.  Mental Status: oriented and alert  Arrived from: Transfer from [facility]  Imaging:   No orders to display     Abnormal labs:   Abnormal Labs Reviewed   CBC WITH AUTO DIFFERENTIAL - Abnormal; Notable for the following components:       Result Value    WBC 11.5 (*)     RBC 3.60 (*)     Hemoglobin 10.7 (*)     Hematocrit 33.5 (*)     Neutrophils Absolute 8.6 (*)     All other components within normal limits       Background  Allergies:   Allergies   Allergen Reactions    Aspirin      Other reaction(s): Unknown (comments)    Ciprofloxacin      Other reaction(s): Drowsiness    Citalopram      Other reaction(s): Unknown (comments)    Clarithromycin      Other reaction(s): Drowsiness    Clindamycin      Other reaction(s): Unknown (comments)    Doxycycline      Other reaction(s): Unknown (comments)    Doxycycline Calcium      Other reaction(s): Unknown (comments)    Levofloxacin      Other reaction(s): Unknown (comments)    Moxifloxacin      Other reaction(s): Unknown (comments)    Nitrofurantoin      Other reaction(s): Unknown (comments)    Oseltamivir      Other reaction(s): Unknown (comments)    Oxycodone-Acetaminophen      Other reaction(s): Unknown (comments)    Penicillins      Other reaction(s): Unknown (comments)    Pravastatin      Other reaction(s): Unknown (comments)    Sulfa Antibiotics      Other reaction(s): Drowsiness

## 2024-05-10 NOTE — H&P
Hospitalist History & Physical Notes.           [facility].              Name : Chichi Ocasio      MRN number : [id number]     YOB: 1960     Subjective :   Chief Complaint : Severe abdominal pain since yesterday morning    Source of information : Patient, ED provider.  Reviewed records from the referring facility and previous records    History of present illness:   Chichi Ocasio is  63 y.o. female with below mentioned past medical history presents to the emergency room complaining of severe abdominal pain and right upper quadrant.  It started morning of yesterday, feeling gassy and bloated.  Rating severe pain denies any nausea or vomiting but she did not pay attention training.  No fever or chills.  No exacerbating relieving factors.  Felt may be because of the gas took medication with no help.    Evaluation in the emergency room with acute cholecystitis suspected common bile duct obstruction with no significant liver enzyme elevation.  As referring facility does not have a surgical services transferred here for higher level of care.  She is still in pain.    Past Medical History:   Diagnosis Date    Anxiety     Chronic sinusitis     Endocrine disorder     GERD (gastroesophageal reflux disease)     History of recurrent UTIs     Hypertension     Left arm pain     Low back pain     Menopause     Myeloradiculopathy     Neck pain     Renal cyst      Past Surgical History:   Procedure Laterality Date    HEMORRHOID SURGERY      HYSTERECTOMY (CERVIX STATUS UNKNOWN)  1995?    Total    LUMBAR FUSION      LUMBAR LAMINECTOMY      OTHER SURGICAL HISTORY      foot surgery    OTHER SURGICAL HISTORY      Cervical dempression    OTHER SURGICAL HISTORY      cervical fusion    OTHER SURGICAL HISTORY      nasal surgery    OVARY REMOVAL       Family History   Problem Relation Age of Onset    COPD Father     Hypertension Mother     Breast Cancer Maternal Aunt       Social History     Tobacco Use     1 MG tablet Take 1 tablet by mouth daily      fluticasone (FLONASE) 50 MCG/ACT nasal spray 2 sprays by Nasal route daily ceived the following from [facility]: Outside name: fluticasone (FLONASE) 50 mcg/actuation nasal spray      pantoprazole (PROTONIX) 40 MG tablet Take 1 tablet by mouth daily ceived the following from [facility]: Outside name: pantoprazole (PROTONIX) 40 mg tablet              Review of Systems: Complete review of systems done, denies any other complaints other than in history of present illness.    Vitals:     Vitals:    05/10/24 0017   BP: (!) 117/93   Pulse: 73   Resp: 13   Temp: 98.1 °F (36.7 °C)   TempSrc: Oral   SpO2: 100%   Weight: 66.2 kg (146 lb)   Height: 1.676 m (5' 6\")       Physical Exam:   General : Tired and uncomfortable due to pain.  No acute respiratory distress noted.    HEENT : PERRLA, dry l oral mucosa, atraumatic normocephalic, Normal ear and nose.  Neck : Supple, no JVD,   Lungs : Breath sounds with good air entry bilaterally, no wheezes or rales, no accessory muscle use.  CVS : Rhythm rate regular, S1+, S2+, no murmur or gallop.  Abdomen : Tight.  Tender on palpation especially right upper quadrant and epigastric area.  She is guarding also or rebound noted.  extremities : No edema noted,  pedal pulses palpable.  Musculoskeletal : Fair range of motion,   Skin : Dry, warm, .  Neurological : Awake, alert, oriented to time place person.        Data Review:   Recent Results (from the past 24 hour(s))   CBC with Auto Differential    Collection Time: 05/09/24  3:25 PM   Result Value Ref Range    WBC 9.3 4.6 - 13.2 K/uL    RBC 3.92 (L) 4.20 - 5.30 M/uL    Hemoglobin 12.0 12.0 - 16.0 g/dL    Hematocrit 36.3 35.0 - 45.0 %    MCV 92.6 78.0 - 100.0 FL    MCH 30.6 24.0 - 34.0 PG    MCHC 33.1 31.0 - 37.0 g/dL    RDW 13.2 11.6 - 14.5 %    Platelets 269 135 - 420 K/uL    MPV 11.0 9.2 - 11.8 FL    Nucleated RBCs 0.0 0.0  WBC    nRBC 0.00 0.00 - 0.01  Studies :   Imaging:   No orders to display          Assessment and Plan :     Acute cholecystitis: CT abdomen suggest gallbladder distention with intrahepatic extrahepatic biliary ductal dilatation.  No mention of cholelithiasis.  Started on ceftriaxone and metronidazole at the referring facility but discontinued.  She has allergy to penicillin so Zosyn was not given.    Essential hypertension: Will continue lisinopril with holding parameters.    Anxiety: On as needed alprazolam, Wellbutrin regularly.    She is on estrogen supplementation, held due to increased risk of thromboembolism especially with requirement of surgery.  She can start back on once she is recovered from current situation.    Medications Home :    Reviewed with external Rx history and verified with patient    Code status : Full code    VTE prophylaxis :  Enoxaparin    Advance Medical directive : NO Health care decision maker information is on file.         Discussion/MDM:   I have discussed patient's presentation/findings and clinical course to date with ED provider.     Given the patient's current clinical presentation, I have a high level of concern for decompensation if discharged from the emergency department as patient has multiple medical comorbidities with increased risk of morbidity and mortality  that warrants admission to hospital.     I have reviewed patient's presenting subjective and objective findings, as well as all laboratory studies, imaging studies, and vital signs to date as well as treatment rendered and patient's response to those treatments.  In addition, prior medical, surgical and relevant social and family histories were reviewed.      CC : Tawanda Sevilla MD  Signed By: Vibha Hsu MD     May 10, 2024      This dictation was done by dragon, computer voice recognition software.  Often unanticipated grammatical, syntax, [employer] phones and other interpretive errors are inadvertently transcribed.  Please excuse

## 2024-05-11 VITALS
DIASTOLIC BLOOD PRESSURE: 70 MMHG | TEMPERATURE: 97.9 F | OXYGEN SATURATION: 91 % | HEIGHT: 66 IN | HEART RATE: 72 BPM | SYSTOLIC BLOOD PRESSURE: 127 MMHG | WEIGHT: 146 LBS | BODY MASS INDEX: 23.46 KG/M2

## 2024-05-11 PROBLEM — Z90.49 S/P LAPAROSCOPIC CHOLECYSTECTOMY: Status: ACTIVE | Noted: 2024-05-11

## 2024-05-11 PROBLEM — I10 HYPERTENSION: Status: ACTIVE | Noted: 2024-05-11

## 2024-05-11 PROBLEM — K81.9 CHOLECYSTITIS: Status: RESOLVED | Noted: 2024-05-10 | Resolved: 2024-05-11

## 2024-05-11 PROBLEM — K81.0 ACUTE CHOLECYSTITIS: Status: RESOLVED | Noted: 2024-05-10 | Resolved: 2024-05-11

## 2024-05-11 PROBLEM — F41.9 ANXIETY: Status: ACTIVE | Noted: 2024-05-11

## 2024-05-11 LAB
ALBUMIN SERPL-MCNC: 2.8 G/DL (ref 3.5–5)
ALBUMIN/GLOB SERPL: 0.8 (ref 1.1–2.2)
ALP SERPL-CCNC: 75 U/L (ref 45–117)
ALT SERPL-CCNC: 16 U/L (ref 12–78)
ANION GAP SERPL CALC-SCNC: 6 MMOL/L (ref 5–15)
AST SERPL W P-5'-P-CCNC: 14 U/L (ref 15–37)
BASOPHILS # BLD: 0.1 K/UL (ref 0–0.1)
BASOPHILS NFR BLD: 1 % (ref 0–1)
BILIRUB SERPL-MCNC: 0.2 MG/DL (ref 0.2–1)
BUN SERPL-MCNC: 10 MG/DL (ref 6–20)
BUN/CREAT SERPL: 10 (ref 12–20)
CA-I BLD-MCNC: 8.8 MG/DL (ref 8.5–10.1)
CHLORIDE SERPL-SCNC: 107 MMOL/L (ref 97–108)
CO2 SERPL-SCNC: 27 MMOL/L (ref 21–32)
CREAT SERPL-MCNC: 0.99 MG/DL (ref 0.55–1.02)
DIFFERENTIAL METHOD BLD: ABNORMAL
EOSINOPHIL # BLD: 0 K/UL (ref 0–0.4)
EOSINOPHIL NFR BLD: 0 % (ref 0–7)
ERYTHROCYTE [DISTWIDTH] IN BLOOD BY AUTOMATED COUNT: 12.9 % (ref 11.5–14.5)
GLOBULIN SER CALC-MCNC: 3.4 G/DL (ref 2–4)
GLUCOSE SERPL-MCNC: 88 MG/DL (ref 65–100)
HCT VFR BLD AUTO: 29.7 % (ref 35–47)
HGB BLD-MCNC: 9.5 G/DL (ref 11.5–16)
IMM GRANULOCYTES # BLD AUTO: 0.1 K/UL (ref 0–0.04)
IMM GRANULOCYTES NFR BLD AUTO: 1 % (ref 0–0.5)
LYMPHOCYTES # BLD: 1.9 K/UL (ref 0.8–3.5)
LYMPHOCYTES NFR BLD: 21 % (ref 12–49)
MCH RBC QN AUTO: 29.8 PG (ref 26–34)
MCHC RBC AUTO-ENTMCNC: 32 G/DL (ref 30–36.5)
MCV RBC AUTO: 93.1 FL (ref 80–99)
MONOCYTES # BLD: 0.8 K/UL (ref 0–1)
MONOCYTES NFR BLD: 8 % (ref 5–13)
NEUTS SEG # BLD: 6.5 K/UL (ref 1.8–8)
NEUTS SEG NFR BLD: 69 % (ref 32–75)
NRBC # BLD: 0 K/UL (ref 0–0.01)
NRBC BLD-RTO: 0 PER 100 WBC
PLATELET # BLD AUTO: 194 K/UL (ref 150–400)
PMV BLD AUTO: 12.1 FL (ref 8.9–12.9)
POTASSIUM SERPL-SCNC: 4 MMOL/L (ref 3.5–5.1)
PROT SERPL-MCNC: 6.2 G/DL (ref 6.4–8.2)
RBC # BLD AUTO: 3.19 M/UL (ref 3.8–5.2)
SODIUM SERPL-SCNC: 140 MMOL/L (ref 136–145)
WBC # BLD AUTO: 9.4 K/UL (ref 3.6–11)

## 2024-05-11 PROCEDURE — 96375 TX/PRO/DX INJ NEW DRUG ADDON: CPT

## 2024-05-11 PROCEDURE — 2580000003 HC RX 258: Performed by: SURGERY

## 2024-05-11 PROCEDURE — 80053 COMPREHEN METABOLIC PANEL: CPT

## 2024-05-11 PROCEDURE — 6360000002 HC RX W HCPCS: Performed by: SURGERY

## 2024-05-11 PROCEDURE — G0378 HOSPITAL OBSERVATION PER HR: HCPCS

## 2024-05-11 PROCEDURE — 85025 COMPLETE CBC W/AUTO DIFF WBC: CPT

## 2024-05-11 PROCEDURE — 6370000000 HC RX 637 (ALT 250 FOR IP): Performed by: SURGERY

## 2024-05-11 PROCEDURE — 96372 THER/PROPH/DIAG INJ SC/IM: CPT

## 2024-05-11 PROCEDURE — 99024 POSTOP FOLLOW-UP VISIT: CPT | Performed by: SURGERY

## 2024-05-11 PROCEDURE — 96365 THER/PROPH/DIAG IV INF INIT: CPT

## 2024-05-11 PROCEDURE — 36415 COLL VENOUS BLD VENIPUNCTURE: CPT

## 2024-05-11 RX ORDER — TRAMADOL HYDROCHLORIDE 50 MG/1
50 TABLET ORAL EVERY 6 HOURS PRN
Qty: 12 TABLET | Refills: 0 | Status: SHIPPED | OUTPATIENT
Start: 2024-05-11 | End: 2024-05-14

## 2024-05-11 RX ORDER — IBUPROFEN 600 MG/1
600 TABLET ORAL 3 TIMES DAILY PRN
Qty: 15 TABLET | Refills: 0 | Status: SHIPPED | OUTPATIENT
Start: 2024-05-11

## 2024-05-11 RX ORDER — ONDANSETRON 4 MG/1
4 TABLET, ORALLY DISINTEGRATING ORAL EVERY 8 HOURS PRN
Qty: 9 TABLET | Refills: 0 | Status: SHIPPED | OUTPATIENT
Start: 2024-05-11 | End: 2024-05-14

## 2024-05-11 RX ORDER — ESTRADIOL 1 MG/1
1 TABLET ORAL DAILY
Qty: 21 TABLET | Refills: 0 | Status: SHIPPED
Start: 2024-05-17

## 2024-05-11 RX ADMIN — PANTOPRAZOLE SODIUM 40 MG: 40 TABLET, DELAYED RELEASE ORAL at 09:16

## 2024-05-11 RX ADMIN — FOLIC ACID 1 MG: 1 TABLET ORAL at 09:16

## 2024-05-11 RX ADMIN — METRONIDAZOLE 500 MG: 500 INJECTION, SOLUTION INTRAVENOUS at 02:23

## 2024-05-11 RX ADMIN — CEFTRIAXONE SODIUM 1000 MG: 1 INJECTION, POWDER, FOR SOLUTION INTRAMUSCULAR; INTRAVENOUS at 06:06

## 2024-05-11 RX ADMIN — Medication 1 LOZENGE: at 06:07

## 2024-05-11 RX ADMIN — TRAMADOL HYDROCHLORIDE 50 MG: 50 TABLET ORAL at 09:16

## 2024-05-11 RX ADMIN — LISINOPRIL 40 MG: 20 TABLET ORAL at 09:16

## 2024-05-11 RX ADMIN — ENOXAPARIN SODIUM 40 MG: 100 INJECTION SUBCUTANEOUS at 09:28

## 2024-05-11 RX ADMIN — BUPROPION HYDROCHLORIDE 150 MG: 150 TABLET, FILM COATED, EXTENDED RELEASE ORAL at 09:15

## 2024-05-11 RX ADMIN — ACETAMINOPHEN 650 MG: 325 TABLET ORAL at 09:16

## 2024-05-11 RX ADMIN — SODIUM CHLORIDE, PRESERVATIVE FREE 10 ML: 5 INJECTION INTRAVENOUS at 09:30

## 2024-05-11 RX ADMIN — ACETAMINOPHEN 650 MG: 325 TABLET ORAL at 02:23

## 2024-05-11 ASSESSMENT — PAIN DESCRIPTION - DESCRIPTORS
DESCRIPTORS: SORE
DESCRIPTORS: ACHING
DESCRIPTORS: DISCOMFORT
DESCRIPTORS: DISCOMFORT

## 2024-05-11 ASSESSMENT — PAIN SCALES - WONG BAKER: WONGBAKER_NUMERICALRESPONSE: 0;6

## 2024-05-11 ASSESSMENT — PAIN DESCRIPTION - ORIENTATION
ORIENTATION: ANTERIOR
ORIENTATION: LEFT

## 2024-05-11 ASSESSMENT — PAIN SCALES - GENERAL
PAINLEVEL_OUTOF10: 0
PAINLEVEL_OUTOF10: 9
PAINLEVEL_OUTOF10: 0
PAINLEVEL_OUTOF10: 7

## 2024-05-11 ASSESSMENT — PAIN - FUNCTIONAL ASSESSMENT: PAIN_FUNCTIONAL_ASSESSMENT: ACTIVITIES ARE NOT PREVENTED

## 2024-05-11 ASSESSMENT — PAIN DESCRIPTION - LOCATION
LOCATION: ABDOMEN
LOCATION: ABDOMEN

## 2024-05-11 NOTE — PLAN OF CARE
Problem: Pain  Goal: Verbalizes/displays adequate comfort level or baseline comfort level  5/11/2024 0755 by Leigha Grewal RN  Outcome: Progressing  5/10/2024 1953 by Araceli Rodriguez RN  Outcome: Progressing  Flowsheets (Taken 5/10/2024 1913)  Verbalizes/displays adequate comfort level or baseline comfort level:   Encourage patient to monitor pain and request assistance   Assess pain using appropriate pain scale   Administer analgesics based on type and severity of pain and evaluate response     Problem: Safety - Adult  Goal: Free from fall injury  5/11/2024 0755 by Leigha Grewal, RN  Outcome: Progressing  5/10/2024 1953 by Araceli Rodriguez RN  Outcome: Progressing

## 2024-05-11 NOTE — PROGRESS NOTES
Progress Note    Patient: Chichi Ocasio MRN: [id number]  SSN: [id number]    YOB: 1960  Age: 63 y.o.  Sex: female      Admit Date: 5/10/2024    LOS: 1 day     Subjective:     Patient is POD#1 s/p Procedure(s) (LRB):  CHOLECYSTECTOMY LAPAROSCOPIC (N/A).     Doing well. Pain well controlled with current pain medications.  Nausea well controlled. Ambulating and voiding adequately.    Objective:     Vitals:    05/10/24 2300 05/10/24 2330 05/11/24 0242 05/11/24 0843   BP:   106/87 127/70   Pulse:   64 72   Resp: 18 18 18 16   Temp:   98.4 °F (36.9 °C) 97.9 °F (36.6 °C)   TempSrc:   Oral Oral   SpO2:   96% 91%   Weight:       Height:            Intake and Output:  Current Shift: No intake/output data recorded.  Last three shifts: 05/09 1901 - 05/11 0700  In: 5252.1 [P.O.:350; I.V.:3602.1]  Out: 620 [Urine:600]    Physical Exam:   General: alert, oriented, in no acute distress  Neck: supple, no masses, no JVD  Cardiovascular: regular rate and rhythm  Pulmonary: unlabored breathing, equal chest rise bilaterally, no wheezing or rhonchi  Abdomen: soft, appropriately tender, non distended, incisions clean, dry, and intact   Extremities: no swelling, pulses equal and palpable in all extremities    Lab/Data Review:  Recent Results (from the past 24 hour(s))   CBC with Auto Differential    Collection Time: 05/11/24  7:33 AM   Result Value Ref Range    WBC 9.4 3.6 - 11.0 K/uL    RBC 3.19 (L) 3.80 - 5.20 M/uL    Hemoglobin 9.5 (L) 11.5 - 16.0 g/dL    Hematocrit 29.7 (L) 35.0 - 47.0 %    MCV 93.1 80.0 - 99.0 FL    MCH 29.8 26.0 - 34.0 PG    MCHC 32.0 30.0 - 36.5 g/dL    RDW 12.9 11.5 - 14.5 %    Platelets 194 150 - 400 K/uL    MPV 12.1 8.9 - 12.9 FL    Nucleated RBCs 0.0 0.0  WBC    nRBC 0.00 0.00 - 0.01 K/uL    Neutrophils % 69 32 - 75 %    Lymphocytes % 21 12 - 49 %    Monocytes % 8 5 - 13 %    Eosinophils % 0 0 - 7 %    Basophils % 1 0 - 1 %    Immature Granulocytes % 1 (H) 0 - 0.5 %    Neutrophils Absolute  6.5 1.8 - 8.0 K/UL    Lymphocytes Absolute 1.9 0.8 - 3.5 K/UL    Monocytes Absolute 0.8 0.0 - 1.0 K/UL    Eosinophils Absolute 0.0 0.0 - 0.4 K/UL    Basophils Absolute 0.1 0.0 - 0.1 K/UL    Immature Granulocytes Absolute 0.1 (H) 0.00 - 0.04 K/UL    Differential Type AUTOMATED     Comprehensive Metabolic Panel    Collection Time: 05/11/24  7:33 AM   Result Value Ref Range    Sodium 140 136 - 145 mmol/L    Potassium 4.0 3.5 - 5.1 mmol/L    Chloride 107 97 - 108 mmol/L    CO2 27 21 - 32 mmol/L    Anion Gap 6 5 - 15 mmol/L    Glucose 88 65 - 100 mg/dL    BUN 10 6 - 20 mg/dL    Creatinine 0.99 0.55 - 1.02 mg/dL    Bun/Cre Ratio 10 (L) 12 - 20      Est, Glom Filt Rate 64 >60 ml/min/1.73m2    Calcium 8.8 8.5 - 10.1 mg/dL    Total Bilirubin 0.2 0.2 - 1.0 mg/dL    AST 14 (L) 15 - 37 U/L    ALT 16 12 - 78 U/L    Alk Phosphatase 75 45 - 117 U/L    Total Protein 6.2 (L) 6.4 - 8.2 g/dL    Albumin 2.8 (L) 3.5 - 5.0 g/dL    Globulin 3.4 2.0 - 4.0 g/dL    Albumin/Globulin Ratio 0.8 (L) 1.1 - 2.2            Assessment:     Principal Problem:    Cholecystitis  Active Problems:    Acute cholecystitis  Resolved Problems:    * No resolved hospital problems. *      POD#1 s/p Procedure(s) (LRB):  CHOLECYSTECTOMY LAPAROSCOPIC (N/A)    Plan:     Diet: regular  DVT/GI prophylaxis  Ambulate  Pain and nausea control  Dispo: cleared for discharge, follow up with me in 2 weeks. Discharge instructions placed in AVS and prescriptions sent to [facility]    Signed By: Helen Hodgson,      May 11, 2024

## 2024-05-11 NOTE — DISCHARGE INSTRUCTIONS
Discharge Instructions for General Surgery Patients       Do not lift any objects weighing more than 20 pounds for 2 weeks.    Do not do any housework including vacuuming, scrubbing or yardwork for 2 weeks.    Do not drive or operate machinery while taking sedating or narcotic medications.     Post operative pain is expected. Try to stop taking narcotics pain medication as soon as able and take tylenol or NSAIDS (ibuprofen, Aleve, Advil, etc). Do not take Tylenol with Norco or Percocet as this may harm your liver. No NSAIDS if you are bariatric surgery patient.    You may walk as desired and go up and down stairs as needed. Walking is encouraged.    You may shower the day after surgery. Do not take tub baths, swim or use hot tubs for 2 weeks. Pat dry wounds after with a towel.    Leave glue on wounds. It will fall off with time. Do not scrub around incisions. If redness develops around the glue okay to peel off in hot shower.    Regular diet. Take Miralax once or twice a day as needed for constipation. You may also use over the counter stool softeners if needed.    Follow up with provider as scheduled.    If you experience fever (greater than 101.5), chills, vomiting or redness or drainage at surgical site, please contact your surgeon’s office.    If you have further questions or concerns, please call your surgeon’s office at [phone].

## 2024-05-11 NOTE — PROGRESS NOTES
Discharge plan of care/case management plan validated with provider discharge order.   Removed patient IV without complications, no tele, no zapien.   Discharge to home self care.

## 2024-05-11 NOTE — DISCHARGE SUMMARY
Discharge Summary    Name: Chichi Ocasio  [id number]  YOB: 1960 (Age: 63 y.o.)   Date of Admission: 5/10/2024  Date of Discharge: 5/11/2024  Attending Physician: Marcos Moseley MD    Discharge Diagnosis:   Principal Problem (Resolved):    Cholecystitis  Active Problems:    S/P laparoscopic cholecystectomy    Hypertension    Anxiety  Resolved Problems:    Acute cholecystitis       Consultations:  IP CONSULT TO GENERAL SURGERY  IP CONSULT TO GI      Brief Admission History/Reason for Admission Per Vibha Hsu MD:   Cholecystitis    Brief Hospital Course by Main Problems:   Chichi Ocasio is a 63-year-old female with hypertension, anxiety, s/p hysterectomy on estrogen supplementation admitted 5/10/2024 for cholecystitis. CT abdomen pelvis shows gallbladder distention with intrahepatic and extrahepatic biliary ductal dilation concerning for acute cholecystitis and potential early ductal obstruction. General surgery consulted, proceeding with cholecystectomy 510/24.  Laparoscopic cholecystectomy completed with no complications.    Discharge Exam:  Patient seen and examined by me on discharge day.  Patient is doing well, no nausea or vomiting today.  She is passing gas But no bowel movement yet.  She has pain in the areas of her incision sites and is tolerating the pain well with oral pain medication.  She denies any chest pain, shortness of breath, calf pain.  Pertinent Findings:  Patient Vitals for the past 24 hrs:   BP Temp Temp src Pulse Resp SpO2   05/11/24 0946 -- -- -- -- (!) 0 --   05/11/24 0843 127/70 97.9 °F (36.6 °C) Oral 72 16 91 %   05/11/24 0242 106/87 98.4 °F (36.9 °C) Oral 64 18 96 %   05/10/24 2330 -- -- -- -- 18 --   05/10/24 2300 -- -- -- -- 18 --   05/10/24 2255 (!) 135/99 97.9 °F (36.6 °C) Oral 70 18 95 %   05/10/24 1913 125/65 97.7 °F (36.5 °C) Oral 63 16 93 %   05/10/24 1803 132/65 98.4 °F (36.9 °C) Oral 66 16 94 %   05/10/24 1645 132/65  97.9 °F (36.6 °C) Oral 67 16 --   05/10/24 1604 -- -- -- -- 16 --   05/10/24 1533 129/60 97.9 °F (36.6 °C) Oral 58 18 100 %   05/10/24 1444 (!) 147/73 97.5 °F (36.4 °C) Axillary 67 16 100 %   05/10/24 1425 117/73 97.9 °F (36.6 °C) Oral 61 18 99 %   05/10/24 1415 (!) 102/91 -- -- 59 12 98 %   05/10/24 1410 136/66 -- -- 61 14 98 %   05/10/24 1405 123/71 -- -- 65 17 99 %   05/10/24 1400 119/62 -- -- 62 14 100 %   05/10/24 1357 124/70 97.8 °F (36.6 °C) Oral 64 13 100 %       Gen:    Not in distress  Chest: Clear lungs  CVS:   Regular rhythm.  No edema  Abd:  Soft, not distended, tender at incision sites. Incisions closed, no erythema or edema noted.   Neuro: alert and oriented x 3    Discharge/Recent Laboratory Results:  Recent Labs     05/10/24  0027 05/11/24  0733    140   K 4.0 4.0   * 107   CO2 27 27   BUN 8 10   CREATININE 0.88 0.99   GLUCOSE 102* 88   CALCIUM 8.3* 8.8   PHOS 2.2*  --      Recent Labs     05/11/24  0733   HGB 9.5*   HCT 29.7*   WBC 9.4          Discharge Medications:     Medication List        START taking these medications      ibuprofen 600 MG tablet  Commonly known as: ADVIL;MOTRIN  Take 1 tablet by mouth 3 times daily as needed for Pain     ondansetron 4 MG disintegrating tablet  Commonly known as: ZOFRAN-ODT  Take 1 tablet by mouth every 8 hours as needed for Nausea or Vomiting     traMADol 50 MG tablet  Commonly known as: Ultram  Take 1 tablet by mouth every 6 hours as needed for Pain for up to 3 days. Intended supply: 3 days. Take lowest dose possible to manage pain Max Daily Amount: 200 mg            CHANGE how you take these medications      estradiol 1 MG tablet  Commonly known as: ESTRACE  Take 1 tablet by mouth daily  Start taking on: May 17, 2024  What changed: These instructions start on May 17, 2024. If you are unsure what to do until then, ask your doctor or other care provider.     lisinopril 40 MG tablet  Commonly known as: PRINIVIL;ZESTRIL  What changed:

## 2024-05-12 LAB
BACTERIA SPEC CULT: NORMAL
BACTERIA SPEC CULT: NORMAL
Lab: NORMAL
Lab: NORMAL

## 2024-05-15 LAB
BACTERIA SPEC CULT: NORMAL
BACTERIA SPEC CULT: NORMAL
Lab: NORMAL
Lab: NORMAL

## 2024-06-10 ENCOUNTER — OFFICE VISIT (OUTPATIENT)
Age: 64
End: 2024-06-10

## 2024-06-10 VITALS
RESPIRATION RATE: 16 BRPM | DIASTOLIC BLOOD PRESSURE: 70 MMHG | BODY MASS INDEX: 22.88 KG/M2 | TEMPERATURE: 98.1 F | HEART RATE: 63 BPM | OXYGEN SATURATION: 94 % | HEIGHT: 66 IN | SYSTOLIC BLOOD PRESSURE: 117 MMHG | WEIGHT: 142.4 LBS

## 2024-06-10 DIAGNOSIS — Z09 POSTOPERATIVE EXAMINATION: Primary | ICD-10-CM

## 2024-06-10 PROCEDURE — 99024 POSTOP FOLLOW-UP VISIT: CPT | Performed by: SURGERY

## 2024-06-10 ASSESSMENT — PATIENT HEALTH QUESTIONNAIRE - PHQ9
2. FEELING DOWN, DEPRESSED OR HOPELESS: NOT AT ALL
SUM OF ALL RESPONSES TO PHQ QUESTIONS 1-9: 0
1. LITTLE INTEREST OR PLEASURE IN DOING THINGS: NOT AT ALL
SUM OF ALL RESPONSES TO PHQ9 QUESTIONS 1 & 2: 0

## 2024-06-10 NOTE — PROGRESS NOTES
Identified pt with two pt identifiers (name and ). Reviewed chart in preparation for visit and have obtained necessary documentation.    Chichi Ocasio is a 63 y.o. female  Chief Complaint   Patient presents with    Post-Op Check     Lap vivi      /70 (Site: Left Upper Arm, Position: Sitting, Cuff Size: Medium Adult)   Pulse 63   Temp 98.1 °F (36.7 °C) (Oral)   Resp 16   Ht 1.676 m (5' 6\")   Wt 64.6 kg (142 lb 6.4 oz)   SpO2 94%   BMI 22.98 kg/m²     1. Have you been to the ER, urgent care clinic since your last visit?  Hospitalized since your last visit?no    2. Have you seen or consulted any other health care providers outside of the [facility] since your last visit?  Include any pap smears or colon screening. no

## 2024-06-21 NOTE — PROGRESS NOTES
[facility]  Helen Hodgson DO  [address], [place] [address]    General Surgery Follow Up    Patient Name: Chichi Ocasio (63 y.o., female)    Patient Address: [address] [place] [id number]    PCP: Tawanda Sevilla MD       Subjective:      Chichi Ocasio is a 63 y.o. female presents for postop care following laparoscopic cholecystectomy on 5/10/2024.  Appetite is good.  Bowel movements are regular. The patient is voiding without difficulty. Patient denies fever, chills, nausea. The patient is not having any pain.    Past Medical History:   Diagnosis Date    Anxiety     Chronic sinusitis     Endocrine disorder     GERD (gastroesophageal reflux disease)     History of recurrent UTIs     Hypertension     Left arm pain     Low back pain     Menopause     Myeloradiculopathy     Neck pain     Renal cyst        Past Surgical History:   Procedure Laterality Date    CHOLECYSTECTOMY, LAPAROSCOPIC N/A 5/10/2024    CHOLECYSTECTOMY LAPAROSCOPIC performed by Helen Hodgson DO at [facility] MAIN OR    HEMORRHOID SURGERY      HYSTERECTOMY (CERVIX STATUS UNKNOWN)  1995?    Total    LUMBAR FUSION      LUMBAR LAMINECTOMY      OTHER SURGICAL HISTORY      foot surgery    OTHER SURGICAL HISTORY      Cervical dempression    OTHER SURGICAL HISTORY      cervical fusion    OTHER SURGICAL HISTORY      nasal surgery    OVARY REMOVAL         Family History   Problem Relation Age of Onset    COPD Father     Hypertension Mother     Breast Cancer Maternal Aunt        Social History     Tobacco Use    Smoking status: Former    Smokeless tobacco: Never    Tobacco comments:     Quit smoking: Quit in 2012   Substance Use Topics    Alcohol use: No    Drug use: No       Current Outpatient Medications   Medication Sig Dispense Refill    ibuprofen (ADVIL;MOTRIN) 600 MG tablet Take 1 tablet by mouth 3 times daily as needed for Pain 15 tablet 0    estradiol (ESTRACE) 1 MG tablet Take 1 tablet

## 2024-07-22 ENCOUNTER — HOSPITAL ENCOUNTER (OUTPATIENT)
Age: 64
Discharge: HOME | End: 2024-07-25
Payer: COMMERCIAL

## 2024-07-22 DIAGNOSIS — D52.9 ANEMIA DUE TO FOLIC ACID DEFICIENCY, UNSPECIFIED DEFICIENCY TYPE: ICD-10-CM

## 2024-07-22 DIAGNOSIS — N39.0 URINARY TRACT INFECTION WITHOUT HEMATURIA, SITE UNSPECIFIED: ICD-10-CM

## 2024-07-22 DIAGNOSIS — E03.9 HYPOTHYROIDISM, UNSPECIFIED TYPE: ICD-10-CM

## 2024-07-22 DIAGNOSIS — D64.9 ANEMIA, UNSPECIFIED TYPE: ICD-10-CM

## 2024-07-22 DIAGNOSIS — D50.9 IRON DEFICIENCY ANEMIA, UNSPECIFIED IRON DEFICIENCY ANEMIA TYPE: ICD-10-CM

## 2024-07-22 DIAGNOSIS — E78.00 PURE HYPERCHOLESTEROLEMIA: ICD-10-CM

## 2024-07-22 DIAGNOSIS — I10 HYPERTENSION, ESSENTIAL: ICD-10-CM

## 2024-07-22 DIAGNOSIS — E53.8 BIOTIN-(PROPIONYL-COA-CARBOXYLASE) LIGASE DEFICIENCY: ICD-10-CM

## 2024-07-22 DIAGNOSIS — E55.9 AVITAMINOSIS D: ICD-10-CM

## 2024-07-22 LAB
25(OH)D3 SERPL-MCNC: 29.4 NG/ML (ref 30–100)
ALBUMIN SERPL-MCNC: 3.7 G/DL (ref 3.4–5)
ALBUMIN/GLOB SERPL: 0.8 (ref 0.8–1.7)
ALP SERPL-CCNC: 89 U/L (ref 45–117)
ALT SERPL-CCNC: 18 U/L (ref 13–56)
ANION GAP SERPL CALC-SCNC: 6 MMOL/L (ref 3–18)
APPEARANCE UR: CLEAR
AST SERPL W P-5'-P-CCNC: 16 U/L (ref 10–38)
BACTERIA URNS QL MICRO: ABNORMAL /HPF
BASOPHILS # BLD: 0.1 K/UL (ref 0–0.1)
BASOPHILS NFR BLD: 1 % (ref 0–2)
BILIRUB SERPL-MCNC: 0.4 MG/DL (ref 0.2–1)
BILIRUB UR QL: NEGATIVE
BUN SERPL-MCNC: 23 MG/DL (ref 7–18)
BUN/CREAT SERPL: 18 (ref 12–20)
CA-I BLD-MCNC: 9.4 MG/DL (ref 8.5–10.1)
CHLORIDE SERPL-SCNC: 106 MMOL/L (ref 100–111)
CHOLEST SERPL-MCNC: 181 MG/DL
CO2 SERPL-SCNC: 26 MMOL/L (ref 21–32)
COLOR UR: YELLOW
CREAT SERPL-MCNC: 1.25 MG/DL (ref 0.6–1.3)
DIFFERENTIAL METHOD BLD: ABNORMAL
EOSINOPHIL # BLD: 0.6 K/UL (ref 0–0.4)
EOSINOPHIL NFR BLD: 7 % (ref 0–5)
EPITH CASTS URNS QL MICRO: ABNORMAL /LPF (ref 0–20)
ERYTHROCYTE [DISTWIDTH] IN BLOOD BY AUTOMATED COUNT: 13.9 % (ref 11.6–14.5)
FERRITIN SERPL-MCNC: 110 NG/ML (ref 8–388)
FOLATE SERPL-MCNC: >20 NG/ML (ref 3.1–17.5)
GLOBULIN SER CALC-MCNC: 4.4 G/DL (ref 2–4)
GLUCOSE SERPL-MCNC: 133 MG/DL (ref 74–99)
GLUCOSE UR STRIP.AUTO-MCNC: NEGATIVE MG/DL
HCT VFR BLD AUTO: 36.4 % (ref 35–45)
HDLC SERPL-MCNC: 46 MG/DL (ref 40–60)
HDLC SERPL: 3.9 (ref 0–5)
HGB BLD-MCNC: 11.5 G/DL (ref 12–16)
HGB UR QL STRIP: NEGATIVE
HYALINE CASTS URNS QL MICRO: ABNORMAL /LPF
IMM GRANULOCYTES # BLD AUTO: 0 K/UL (ref 0–0.04)
IMM GRANULOCYTES NFR BLD AUTO: 0 % (ref 0–0.5)
KETONES UR QL STRIP.AUTO: NEGATIVE MG/DL
LDLC SERPL CALC-MCNC: 119.6 MG/DL (ref 0–100)
LEUKOCYTE ESTERASE UR QL STRIP.AUTO: ABNORMAL
LIPID PANEL: ABNORMAL
LYMPHOCYTES # BLD: 2.2 K/UL (ref 0.9–3.6)
LYMPHOCYTES NFR BLD: 27 % (ref 21–52)
MCH RBC QN AUTO: 29.1 PG (ref 24–34)
MCHC RBC AUTO-ENTMCNC: 31.6 G/DL (ref 31–37)
MCV RBC AUTO: 92.2 FL (ref 78–100)
MONOCYTES # BLD: 0.6 K/UL (ref 0.05–1.2)
MONOCYTES NFR BLD: 7 % (ref 3–10)
NEUTS SEG # BLD: 4.8 K/UL (ref 1.8–8)
NEUTS SEG NFR BLD: 58 % (ref 40–73)
NITRITE UR QL STRIP.AUTO: POSITIVE
NRBC # BLD: 0 K/UL (ref 0–0.01)
NRBC BLD-RTO: 0 PER 100 WBC
PH UR STRIP: 5.5 (ref 5–8)
PLATELET # BLD AUTO: 298 K/UL (ref 135–420)
PMV BLD AUTO: 11 FL (ref 9.2–11.8)
POTASSIUM SERPL-SCNC: 3.9 MMOL/L (ref 3.5–5.5)
PROT SERPL-MCNC: 8.1 G/DL (ref 6.4–8.2)
PROT UR STRIP-MCNC: NEGATIVE MG/DL
RBC # BLD AUTO: 3.95 M/UL (ref 4.2–5.3)
RBC #/AREA URNS HPF: ABNORMAL /HPF (ref 0–2)
SODIUM SERPL-SCNC: 138 MMOL/L (ref 136–145)
SP GR UR REFRACTOMETRY: 1.01 (ref 1–1.03)
T4 FREE SERPL-MCNC: 1.1 NG/DL (ref 0.7–1.5)
TRIGL SERPL-MCNC: 77 MG/DL
TSH SERPL DL<=0.05 MIU/L-ACNC: 1.62 UIU/ML (ref 0.36–3.74)
UROBILINOGEN UR QL STRIP.AUTO: 0.2 EU/DL (ref 0.2–1)
VIT B12 SERPL-MCNC: >2000 PG/ML (ref 211–911)
VLDLC SERPL CALC-MCNC: 15.4 MG/DL
WBC # BLD AUTO: 8.2 K/UL (ref 4.6–13.2)
WBC URNS QL MICRO: ABNORMAL /HPF (ref 0–4)

## 2024-07-22 PROCEDURE — 36415 COLL VENOUS BLD VENIPUNCTURE: CPT

## 2024-07-22 PROCEDURE — 82306 VITAMIN D 25 HYDROXY: CPT

## 2024-07-22 PROCEDURE — 84443 ASSAY THYROID STIM HORMONE: CPT

## 2024-07-22 PROCEDURE — 82746 ASSAY OF FOLIC ACID SERUM: CPT

## 2024-07-22 PROCEDURE — 84439 ASSAY OF FREE THYROXINE: CPT

## 2024-07-22 PROCEDURE — 80061 LIPID PANEL: CPT

## 2024-07-22 PROCEDURE — 87186 SC STD MICRODIL/AGAR DIL: CPT

## 2024-07-22 PROCEDURE — 82607 VITAMIN B-12: CPT

## 2024-07-22 PROCEDURE — 85025 COMPLETE CBC W/AUTO DIFF WBC: CPT

## 2024-07-22 PROCEDURE — 82728 ASSAY OF FERRITIN: CPT

## 2024-07-22 PROCEDURE — 80053 COMPREHEN METABOLIC PANEL: CPT

## 2024-07-22 PROCEDURE — 87077 CULTURE AEROBIC IDENTIFY: CPT

## 2024-07-22 PROCEDURE — 81001 URINALYSIS AUTO W/SCOPE: CPT

## 2024-07-22 PROCEDURE — 87086 URINE CULTURE/COLONY COUNT: CPT

## 2024-07-24 LAB
BACTERIA SPEC CULT: ABNORMAL
COLONY COUNT, CNT: ABNORMAL
Lab: ABNORMAL

## 2024-07-25 ENCOUNTER — HOSPITAL ENCOUNTER (OUTPATIENT)
Age: 64
Discharge: HOME | End: 2024-07-25
Payer: COMMERCIAL

## 2024-07-25 VITALS — BODY MASS INDEX: 22.18 KG/M2 | WEIGHT: 138 LBS | HEIGHT: 66 IN

## 2024-07-25 DIAGNOSIS — Z12.31 VISIT FOR SCREENING MAMMOGRAM: ICD-10-CM

## 2024-07-25 PROCEDURE — 77063 BREAST TOMOSYNTHESIS BI: CPT

## 2024-09-12 ENCOUNTER — TRANSCRIBE ORDERS (OUTPATIENT)
Facility: HOSPITAL | Age: 64
End: 2024-09-12

## 2024-09-12 DIAGNOSIS — R92.8 ABNORMAL MAMMOGRAM: Primary | ICD-10-CM

## 2025-01-27 ENCOUNTER — HOSPITAL ENCOUNTER (OUTPATIENT)
Age: 65
Setting detail: SPECIMEN
Discharge: HOME | End: 2025-01-30
Payer: COMMERCIAL

## 2025-01-27 ENCOUNTER — TRANSCRIBE ORDERS (OUTPATIENT)
Age: 65
End: 2025-01-27

## 2025-01-27 DIAGNOSIS — E55.9 AVITAMINOSIS D: ICD-10-CM

## 2025-01-27 DIAGNOSIS — E03.9 MYXEDEMA HEART DISEASE: ICD-10-CM

## 2025-01-27 DIAGNOSIS — I10 ESSENTIAL HYPERTENSION, MALIGNANT: ICD-10-CM

## 2025-01-27 DIAGNOSIS — I51.9 MYXEDEMA HEART DISEASE: ICD-10-CM

## 2025-01-27 DIAGNOSIS — E55.9 AVITAMINOSIS D: Primary | ICD-10-CM

## 2025-01-27 LAB
ALBUMIN SERPL-MCNC: 3.5 G/DL (ref 3.4–5)
ALBUMIN/GLOB SERPL: 0.8 (ref 0.8–1.7)
ALP SERPL-CCNC: 94 U/L (ref 45–117)
ALT SERPL-CCNC: 18 U/L (ref 13–56)
ANION GAP SERPL CALC-SCNC: 5 MMOL/L (ref 3–18)
AST SERPL W P-5'-P-CCNC: 12 U/L (ref 10–38)
BASOPHILS # BLD: 0.08 K/UL (ref 0–0.1)
BASOPHILS NFR BLD: 1 % (ref 0–2)
BILIRUB SERPL-MCNC: 0.3 MG/DL (ref 0.2–1)
BUN SERPL-MCNC: 35 MG/DL (ref 7–18)
BUN/CREAT SERPL: 22 (ref 12–20)
CA-I BLD-MCNC: 9.1 MG/DL (ref 8.5–10.1)
CHLORIDE SERPL-SCNC: 107 MMOL/L (ref 100–111)
CO2 SERPL-SCNC: 27 MMOL/L (ref 21–32)
CREAT SERPL-MCNC: 1.59 MG/DL (ref 0.6–1.3)
DIFFERENTIAL METHOD BLD: ABNORMAL
EOSINOPHIL # BLD: 0.22 K/UL (ref 0–0.4)
EOSINOPHIL NFR BLD: 2.6 % (ref 0–5)
ERYTHROCYTE [DISTWIDTH] IN BLOOD BY AUTOMATED COUNT: 13.5 % (ref 11.6–14.5)
GLOBULIN SER CALC-MCNC: 4.4 G/DL (ref 2–4)
GLUCOSE SERPL-MCNC: 102 MG/DL (ref 74–99)
HCT VFR BLD AUTO: 32.4 % (ref 35–45)
HGB BLD-MCNC: 10.5 G/DL (ref 12–16)
IMM GRANULOCYTES # BLD AUTO: 0.03 K/UL (ref 0–0.04)
IMM GRANULOCYTES NFR BLD AUTO: 0.4 % (ref 0–0.5)
LYMPHOCYTES # BLD: 2.03 K/UL (ref 0.9–3.6)
LYMPHOCYTES NFR BLD: 24.2 % (ref 21–52)
MCH RBC QN AUTO: 30.3 PG (ref 24–34)
MCHC RBC AUTO-ENTMCNC: 32.4 G/DL (ref 31–37)
MCV RBC AUTO: 93.4 FL (ref 78–100)
MONOCYTES # BLD: 0.57 K/UL (ref 0.05–1.2)
MONOCYTES NFR BLD: 6.8 % (ref 3–10)
NEUTS SEG # BLD: 5.47 K/UL (ref 1.8–8)
NEUTS SEG NFR BLD: 65 % (ref 40–73)
NRBC # BLD: 0 K/UL (ref 0–0.01)
NRBC BLD-RTO: 0 PER 100 WBC
PLATELET # BLD AUTO: 297 K/UL (ref 135–420)
PMV BLD AUTO: 11.2 FL (ref 9.2–11.8)
POTASSIUM SERPL-SCNC: 4.6 MMOL/L (ref 3.5–5.5)
PROT SERPL-MCNC: 7.9 G/DL (ref 6.4–8.2)
RBC # BLD AUTO: 3.47 M/UL (ref 4.2–5.3)
SODIUM SERPL-SCNC: 139 MMOL/L (ref 136–145)
TSH SERPL DL<=0.05 MIU/L-ACNC: 0.69 UIU/ML (ref 0.36–3.74)
WBC # BLD AUTO: 8.4 K/UL (ref 4.6–13.2)

## 2025-01-27 PROCEDURE — 82728 ASSAY OF FERRITIN: CPT

## 2025-01-27 PROCEDURE — 82306 VITAMIN D 25 HYDROXY: CPT

## 2025-01-27 PROCEDURE — 80053 COMPREHEN METABOLIC PANEL: CPT

## 2025-01-27 PROCEDURE — 36415 COLL VENOUS BLD VENIPUNCTURE: CPT

## 2025-01-27 PROCEDURE — 84439 ASSAY OF FREE THYROXINE: CPT

## 2025-01-27 PROCEDURE — 85025 COMPLETE CBC W/AUTO DIFF WBC: CPT

## 2025-01-27 PROCEDURE — 80061 LIPID PANEL: CPT

## 2025-01-27 PROCEDURE — 82746 ASSAY OF FOLIC ACID SERUM: CPT

## 2025-01-27 PROCEDURE — 84443 ASSAY THYROID STIM HORMONE: CPT

## 2025-01-28 LAB
25(OH)D3 SERPL-MCNC: 98.3 NG/ML (ref 30–100)
CHOLEST SERPL-MCNC: 164 MG/DL
FERRITIN SERPL-MCNC: 55 NG/ML (ref 8–388)
FOLATE SERPL-MCNC: >20 NG/ML (ref 3.1–17.5)
HDLC SERPL-MCNC: 37 MG/DL (ref 40–60)
HDLC SERPL: 4.4 (ref 0–5)
LDLC SERPL CALC-MCNC: 104.8 MG/DL (ref 0–100)
LIPID PANEL: ABNORMAL
T4 FREE SERPL-MCNC: 1.3 NG/DL (ref 0.7–1.5)
TRIGL SERPL-MCNC: 111 MG/DL
VLDLC SERPL CALC-MCNC: 22.2 MG/DL

## 2025-02-17 ENCOUNTER — HOSPITAL ENCOUNTER (OUTPATIENT)
Age: 65
Discharge: HOME | End: 2025-02-20
Payer: COMMERCIAL

## 2025-02-17 DIAGNOSIS — I10 HYPERTENSION, ESSENTIAL: ICD-10-CM

## 2025-02-17 DIAGNOSIS — D64.9 ANEMIA, UNSPECIFIED TYPE: ICD-10-CM

## 2025-02-17 DIAGNOSIS — D50.9 IRON DEFICIENCY ANEMIA, UNSPECIFIED IRON DEFICIENCY ANEMIA TYPE: ICD-10-CM

## 2025-02-17 LAB
ALBUMIN SERPL-MCNC: 3.5 G/DL (ref 3.4–5)
ALBUMIN/GLOB SERPL: 0.8 (ref 0.8–1.7)
ALP SERPL-CCNC: 82 U/L (ref 45–117)
ALT SERPL-CCNC: 16 U/L (ref 13–56)
ANION GAP SERPL CALC-SCNC: 6 MMOL/L (ref 3–18)
AST SERPL W P-5'-P-CCNC: 11 U/L (ref 10–38)
BASOPHILS # BLD: 0.08 K/UL (ref 0–0.1)
BASOPHILS NFR BLD: 1.3 % (ref 0–2)
BILIRUB SERPL-MCNC: 0.3 MG/DL (ref 0.2–1)
BUN SERPL-MCNC: 20 MG/DL (ref 7–18)
BUN/CREAT SERPL: 16 (ref 12–20)
CA-I BLD-MCNC: 9 MG/DL (ref 8.5–10.1)
CHLORIDE SERPL-SCNC: 105 MMOL/L (ref 100–111)
CO2 SERPL-SCNC: 27 MMOL/L (ref 21–32)
CREAT SERPL-MCNC: 1.27 MG/DL (ref 0.6–1.3)
DIFFERENTIAL METHOD BLD: ABNORMAL
EOSINOPHIL # BLD: 0.26 K/UL (ref 0–0.4)
EOSINOPHIL NFR BLD: 4.1 % (ref 0–5)
ERYTHROCYTE [DISTWIDTH] IN BLOOD BY AUTOMATED COUNT: 13.4 % (ref 11.6–14.5)
FERRITIN SERPL-MCNC: 60 NG/ML (ref 8–388)
GLOBULIN SER CALC-MCNC: 4.2 G/DL (ref 2–4)
GLUCOSE SERPL-MCNC: 99 MG/DL (ref 74–99)
HCT VFR BLD AUTO: 34.3 % (ref 35–45)
HGB BLD-MCNC: 10.9 G/DL (ref 12–16)
IMM GRANULOCYTES # BLD AUTO: 0.01 K/UL (ref 0–0.04)
IMM GRANULOCYTES NFR BLD AUTO: 0.2 % (ref 0–0.5)
LYMPHOCYTES # BLD: 1.9 K/UL (ref 0.9–3.6)
LYMPHOCYTES NFR BLD: 30.1 % (ref 21–52)
MCH RBC QN AUTO: 29.7 PG (ref 24–34)
MCHC RBC AUTO-ENTMCNC: 31.8 G/DL (ref 31–37)
MCV RBC AUTO: 93.5 FL (ref 78–100)
MONOCYTES # BLD: 0.52 K/UL (ref 0.05–1.2)
MONOCYTES NFR BLD: 8.2 % (ref 3–10)
NEUTS SEG # BLD: 3.55 K/UL (ref 1.8–8)
NEUTS SEG NFR BLD: 56.1 % (ref 40–73)
NRBC # BLD: 0 K/UL (ref 0–0.01)
NRBC BLD-RTO: 0 PER 100 WBC
PLATELET # BLD AUTO: 298 K/UL (ref 135–420)
PMV BLD AUTO: 11.1 FL (ref 9.2–11.8)
POTASSIUM SERPL-SCNC: 4.1 MMOL/L (ref 3.5–5.5)
PROT SERPL-MCNC: 7.7 G/DL (ref 6.4–8.2)
RBC # BLD AUTO: 3.67 M/UL (ref 4.2–5.3)
SODIUM SERPL-SCNC: 138 MMOL/L (ref 136–145)
WBC # BLD AUTO: 6.3 K/UL (ref 4.6–13.2)

## 2025-02-17 PROCEDURE — 36415 COLL VENOUS BLD VENIPUNCTURE: CPT

## 2025-02-17 PROCEDURE — 82728 ASSAY OF FERRITIN: CPT

## 2025-02-17 PROCEDURE — 80053 COMPREHEN METABOLIC PANEL: CPT

## 2025-02-17 PROCEDURE — 85025 COMPLETE CBC W/AUTO DIFF WBC: CPT

## 2025-04-16 ENCOUNTER — TRANSCRIBE ORDERS (OUTPATIENT)
Age: 65
End: 2025-04-16

## 2025-04-16 ENCOUNTER — HOSPITAL ENCOUNTER (OUTPATIENT)
Age: 65
Setting detail: SPECIMEN
Discharge: HOME | End: 2025-04-19
Payer: COMMERCIAL

## 2025-04-16 DIAGNOSIS — D64.9 ANEMIA, UNSPECIFIED TYPE: ICD-10-CM

## 2025-04-16 DIAGNOSIS — I10 ESSENTIAL HYPERTENSION, MALIGNANT: Primary | ICD-10-CM

## 2025-04-16 DIAGNOSIS — D50.9 IRON DEFICIENCY ANEMIA, UNSPECIFIED IRON DEFICIENCY ANEMIA TYPE: ICD-10-CM

## 2025-04-16 DIAGNOSIS — I10 ESSENTIAL HYPERTENSION, MALIGNANT: ICD-10-CM

## 2025-04-16 LAB
ALBUMIN SERPL-MCNC: 3.6 G/DL (ref 3.4–5)
ALBUMIN/GLOB SERPL: 0.9 (ref 0.8–1.7)
ALP SERPL-CCNC: 92 U/L (ref 45–117)
ALT SERPL-CCNC: 16 U/L (ref 13–56)
ANION GAP SERPL CALC-SCNC: 8 MMOL/L (ref 3–18)
AST SERPL W P-5'-P-CCNC: 9 U/L (ref 10–38)
BASOPHILS # BLD: 0.1 K/UL (ref 0–0.1)
BASOPHILS NFR BLD: 1.2 % (ref 0–2)
BILIRUB SERPL-MCNC: 0.2 MG/DL (ref 0.2–1)
BUN SERPL-MCNC: 23 MG/DL (ref 7–18)
BUN/CREAT SERPL: 18 (ref 12–20)
CA-I BLD-MCNC: 9.6 MG/DL (ref 8.5–10.1)
CHLORIDE SERPL-SCNC: 105 MMOL/L (ref 100–111)
CO2 SERPL-SCNC: 26 MMOL/L (ref 21–32)
CREAT SERPL-MCNC: 1.31 MG/DL (ref 0.6–1.3)
DIFFERENTIAL METHOD BLD: ABNORMAL
EOSINOPHIL # BLD: 0.29 K/UL (ref 0–0.4)
EOSINOPHIL NFR BLD: 3.6 % (ref 0–5)
ERYTHROCYTE [DISTWIDTH] IN BLOOD BY AUTOMATED COUNT: 12.5 % (ref 11.6–14.5)
GLOBULIN SER CALC-MCNC: 4.2 G/DL (ref 2–4)
GLUCOSE SERPL-MCNC: 101 MG/DL (ref 74–99)
HCT VFR BLD AUTO: 34 % (ref 35–45)
HGB BLD-MCNC: 10.7 G/DL (ref 12–16)
IMM GRANULOCYTES # BLD AUTO: 0.02 K/UL (ref 0–0.04)
IMM GRANULOCYTES NFR BLD AUTO: 0.2 % (ref 0–0.5)
LYMPHOCYTES # BLD: 2.05 K/UL (ref 0.9–3.6)
LYMPHOCYTES NFR BLD: 25.6 % (ref 21–52)
MCH RBC QN AUTO: 29.5 PG (ref 24–34)
MCHC RBC AUTO-ENTMCNC: 31.5 G/DL (ref 31–37)
MCV RBC AUTO: 93.7 FL (ref 78–100)
MONOCYTES # BLD: 0.68 K/UL (ref 0.05–1.2)
MONOCYTES NFR BLD: 8.5 % (ref 3–10)
NEUTS SEG # BLD: 4.88 K/UL (ref 1.8–8)
NEUTS SEG NFR BLD: 60.9 % (ref 40–73)
NRBC # BLD: 0 K/UL (ref 0–0.01)
NRBC BLD-RTO: 0 PER 100 WBC
PLATELET # BLD AUTO: 306 K/UL (ref 135–420)
PMV BLD AUTO: 10.9 FL (ref 9.2–11.8)
POTASSIUM SERPL-SCNC: 4 MMOL/L (ref 3.5–5.5)
PROT SERPL-MCNC: 7.8 G/DL (ref 6.4–8.2)
RBC # BLD AUTO: 3.63 M/UL (ref 4.2–5.3)
SODIUM SERPL-SCNC: 139 MMOL/L (ref 136–145)
WBC # BLD AUTO: 8 K/UL (ref 4.6–13.2)

## 2025-04-16 PROCEDURE — 36415 COLL VENOUS BLD VENIPUNCTURE: CPT

## 2025-04-16 PROCEDURE — 82728 ASSAY OF FERRITIN: CPT

## 2025-04-16 PROCEDURE — 80053 COMPREHEN METABOLIC PANEL: CPT

## 2025-04-16 PROCEDURE — 85025 COMPLETE CBC W/AUTO DIFF WBC: CPT

## 2025-04-17 LAB — FERRITIN SERPL-MCNC: 55 NG/ML (ref 8–388)

## 2025-05-01 ENCOUNTER — OFFICE VISIT (OUTPATIENT)
Age: 65
End: 2025-05-01

## 2025-05-01 VITALS — HEIGHT: 67 IN | BODY MASS INDEX: 21.5 KG/M2 | WEIGHT: 137 LBS

## 2025-05-01 DIAGNOSIS — M79.642 LEFT HAND PAIN: Primary | ICD-10-CM

## 2025-05-01 DIAGNOSIS — M25.442 EFFUSION OF JOINT OF LEFT HAND: ICD-10-CM

## 2025-05-01 DIAGNOSIS — M65.312 TRIGGER THUMB OF LEFT HAND: ICD-10-CM

## 2025-05-01 RX ORDER — TRIAMCINOLONE ACETONIDE 40 MG/ML
40 INJECTION, SUSPENSION INTRA-ARTICULAR; INTRAMUSCULAR ONCE
Status: COMPLETED | OUTPATIENT
Start: 2025-05-01 | End: 2025-05-01

## 2025-05-01 RX ORDER — LIDOCAINE HYDROCHLORIDE 10 MG/ML
9 INJECTION, SOLUTION INFILTRATION; PERINEURAL ONCE
Status: COMPLETED | OUTPATIENT
Start: 2025-05-01 | End: 2025-05-01

## 2025-05-01 RX ADMIN — LIDOCAINE HYDROCHLORIDE 9 ML: 10 INJECTION, SOLUTION INFILTRATION; PERINEURAL at 11:03

## 2025-05-01 RX ADMIN — TRIAMCINOLONE ACETONIDE 40 MG: 40 INJECTION, SUSPENSION INTRA-ARTICULAR; INTRAMUSCULAR at 11:04

## 2025-05-01 NOTE — PROGRESS NOTES
Name: Chichi Ocasio    : 1960     [facility]  [address]  [facility] [address]  Dept: [phone]  Dept Fax: [phone]   Chief Complaint   Patient presents with    Hand Pain     Ht 1.702 m (5' 7\")   Wt 62.1 kg (137 lb)   BMI 21.46 kg/m²    Allergies   Allergen Reactions    Aspirin      Other reaction(s): Unknown (comments)    Ciprofloxacin      Other reaction(s): Drowsiness    Citalopram      Other reaction(s): Unknown (comments)    Clarithromycin      Other reaction(s): Drowsiness    Clindamycin      Other reaction(s): Unknown (comments)    Doxycycline      Other reaction(s): Unknown (comments)    Doxycycline Calcium      Other reaction(s): Unknown (comments)    Levofloxacin      Other reaction(s): Unknown (comments)    Moxifloxacin      Other reaction(s): Unknown (comments)    Nitrofurantoin      Other reaction(s): Unknown (comments)    Oseltamivir      Other reaction(s): Unknown (comments)    Oxycodone-Acetaminophen      Other reaction(s): Unknown (comments)    Penicillins      Other reaction(s): Unknown (comments)    Pravastatin      Other reaction(s): Unknown (comments)    Sulfa Antibiotics      Other reaction(s): Drowsiness    Sulfamethoxazole-Trimethoprim      Other reaction(s): Unknown (comments)    Tetracycline      Other reaction(s): Unknown (comments)    Vancomycin      Other reaction(s): Unknown (comments)     Current Outpatient Medications   Medication Sig Dispense Refill    ibuprofen (ADVIL;MOTRIN) 600 MG tablet Take 1 tablet by mouth 3 times daily as needed for Pain 15 tablet 0    estradiol (ESTRACE) 1 MG tablet Take 1 tablet by mouth daily 21 tablet 0    cetirizine (ZYRTEC) 10 MG tablet Take 1 tablet by mouth 2 times daily as needed for Allergies      folic acid (FOLVITE) 1 MG tablet Take 1 tablet by mouth daily      lisinopril (PRINIVIL;ZESTRIL) 40 MG tablet Take 1 tablet by mouth

## 2025-05-01 NOTE — PATIENT INSTRUCTIONS
locks in a bent position and will not straighten.   Watch closely for changes in your health, and be sure to contact your doctor if:    You do not get better as expected.   Where can you learn more?  Go to [email or web address]://[email or web address]/patientEd and enter M826 to learn more about \"Trigger Finger: Care Instructions.\"  Current as of: March 9, 2022               Content Version: 13.5  © 9359-3042 [employer].   Care instructions adapted under license by [employer]. If you have questions about a medical condition or this instruction, always ask your healthcare professional. [employer] disclaims any warranty or liability for your use of this information.

## 2025-05-22 ENCOUNTER — OFFICE VISIT (OUTPATIENT)
Age: 65
End: 2025-05-22
Payer: COMMERCIAL

## 2025-05-22 ENCOUNTER — HOSPITAL ENCOUNTER (OUTPATIENT)
Age: 65
Discharge: HOME | End: 2025-05-25
Payer: COMMERCIAL

## 2025-05-22 ENCOUNTER — HOSPITAL ENCOUNTER (OUTPATIENT)
Age: 65
Setting detail: SPECIMEN
Discharge: HOME | End: 2025-05-25
Payer: COMMERCIAL

## 2025-05-22 DIAGNOSIS — K21.9 GASTROESOPHAGEAL REFLUX DISEASE WITHOUT ESOPHAGITIS: Primary | ICD-10-CM

## 2025-05-22 DIAGNOSIS — K21.9 GASTROESOPHAGEAL REFLUX DISEASE WITHOUT ESOPHAGITIS: ICD-10-CM

## 2025-05-22 DIAGNOSIS — M65.312 TRIGGER THUMB OF LEFT HAND: ICD-10-CM

## 2025-05-22 LAB
ANION GAP SERPL CALC-SCNC: 11 MMOL/L
BUN SERPL-MCNC: 21 MG/DL (ref 6–23)
BUN/CREAT SERPL: 16
CA-I BLD-MCNC: 9.3 MG/DL (ref 8.5–10.1)
CHLORIDE SERPL-SCNC: 100 MMOL/L (ref 98–107)
CO2 SERPL-SCNC: 26 MMOL/L (ref 21–32)
CREAT SERPL-MCNC: 1.29 MG/DL (ref 0.6–1.3)
ERYTHROCYTE [DISTWIDTH] IN BLOOD BY AUTOMATED COUNT: 13 % (ref 11.6–14.5)
GLUCOSE SERPL-MCNC: 93 MG/DL (ref 74–108)
HCT VFR BLD AUTO: 34.5 % (ref 35–45)
HGB BLD-MCNC: 11.2 G/DL (ref 12–16)
MCH RBC QN AUTO: 29.9 PG (ref 24–34)
MCHC RBC AUTO-ENTMCNC: 32.5 G/DL (ref 31–37)
MCV RBC AUTO: 92 FL (ref 78–100)
NRBC # BLD: 0 K/UL (ref 0–0.01)
NRBC BLD-RTO: 0 PER 100 WBC
PLATELET # BLD AUTO: 277 K/UL (ref 135–420)
PMV BLD AUTO: 11.3 FL (ref 9.2–11.8)
POTASSIUM SERPL-SCNC: 3.9 MMOL/L (ref 3.5–5.5)
RBC # BLD AUTO: 3.75 M/UL (ref 4.2–5.3)
SODIUM SERPL-SCNC: 137 MMOL/L (ref 136–145)
WBC # BLD AUTO: 8.5 K/UL (ref 4.6–13.2)

## 2025-05-22 PROCEDURE — 36415 COLL VENOUS BLD VENIPUNCTURE: CPT

## 2025-05-22 PROCEDURE — 80048 BASIC METABOLIC PNL TOTAL CA: CPT

## 2025-05-22 PROCEDURE — 71046 X-RAY EXAM CHEST 2 VIEWS: CPT

## 2025-05-22 PROCEDURE — 1123F ACP DISCUSS/DSCN MKR DOCD: CPT | Performed by: ORTHOPAEDIC SURGERY

## 2025-05-22 PROCEDURE — 93005 ELECTROCARDIOGRAM TRACING: CPT

## 2025-05-22 PROCEDURE — 85027 COMPLETE CBC AUTOMATED: CPT

## 2025-05-22 PROCEDURE — 99214 OFFICE O/P EST MOD 30 MIN: CPT | Performed by: ORTHOPAEDIC SURGERY

## 2025-05-22 NOTE — PROGRESS NOTES
surgery    OVARY REMOVAL        Past Medical History:   Diagnosis Date    Anxiety     Chronic sinusitis     Endocrine disorder     GERD (gastroesophageal reflux disease)     History of recurrent UTIs     Hypertension     Left arm pain     Low back pain     Menopause     Myeloradiculopathy     Neck pain     Renal cyst         I have reviewed and agree with PFSH and ROS and intake form in chart and the record furthermore I have reviewed prior medical record(s) regarding this patients care during this appointment.   Review of Systems:   Patient is a pleasant appearing individual, appropriately dressed, well hydrated, well nourished, who is alert, appropriately oriented for age, and in no acute distress with a normal gait and normal affect who does not appear to be in any significant pain.     Physical Exam:  Left thumb is grossly neurovascularly intact with good cap refill, slight decreased range of motion with good strength, mechanical locking noted and point tenderness at the A1 pulley, no swelling, no instability and no other skin lesions noted.      Right Hand - No point tenderness, Full range of motion, No instability, No Weakness, No, skin lesions, No swelling, Grossly neurovascularly intact.     A consent for surgery will be documented and signed by the patient or a legal guardian. All questions were answered. The risks of surgery were explained to the patient which include but not limited to infection, reoperation, multiple operations, nerve injury, artery injury, tendon injury, poor result, poor wound healing, unforeseen incidence, etc. Patient was told of no guarantees. Patient accepts all risks and benefits.    All pertinent diagnostic studies and clearances have been reviewed.  Decision for surgery will be made today to proceed.    The patient was counseled about the risks of jesús Covid-19 during their perioperative period and any recovery window from their procedure. The patient was made aware that

## 2025-05-23 LAB
EKG ATRIAL RATE: 73 BPM
EKG DIAGNOSIS: NORMAL
EKG P AXIS: 71 DEGREES
EKG P-R INTERVAL: 158 MS
EKG Q-T INTERVAL: 388 MS
EKG QRS DURATION: 86 MS
EKG QTC CALCULATION (BAZETT): 427 MS
EKG R AXIS: 51 DEGREES
EKG T AXIS: 64 DEGREES
EKG VENTRICULAR RATE: 73 BPM

## 2025-05-28 DIAGNOSIS — M65.312 TRIGGER THUMB OF LEFT HAND: ICD-10-CM

## 2025-06-06 ENCOUNTER — TRANSCRIBE ORDERS (OUTPATIENT)
Age: 65
End: 2025-06-06

## 2025-06-06 ENCOUNTER — HOSPITAL ENCOUNTER (OUTPATIENT)
Age: 65
Setting detail: SPECIMEN
Discharge: HOME | End: 2025-06-09
Payer: COMMERCIAL

## 2025-06-06 DIAGNOSIS — I51.9 MYXEDEMA HEART DISEASE: ICD-10-CM

## 2025-06-06 DIAGNOSIS — I10 ESSENTIAL HYPERTENSION, MALIGNANT: ICD-10-CM

## 2025-06-06 DIAGNOSIS — E03.9 MYXEDEMA HEART DISEASE: ICD-10-CM

## 2025-06-06 DIAGNOSIS — N39.0 URINARY TRACT INFECTION WITHOUT HEMATURIA, SITE UNSPECIFIED: ICD-10-CM

## 2025-06-06 DIAGNOSIS — D64.9 ANEMIA, UNSPECIFIED TYPE: ICD-10-CM

## 2025-06-06 DIAGNOSIS — I10 ESSENTIAL HYPERTENSION, MALIGNANT: Primary | ICD-10-CM

## 2025-06-06 LAB
ALBUMIN SERPL-MCNC: 3.5 G/DL (ref 3.4–5)
ALBUMIN/GLOB SERPL: 1
ALP SERPL-CCNC: 89 U/L (ref 45–117)
ALT SERPL-CCNC: 11 U/L (ref 10–35)
ANION GAP SERPL CALC-SCNC: 11 MMOL/L
APPEARANCE UR: CLEAR
AST SERPL W P-5'-P-CCNC: 16 U/L (ref 10–38)
BACTERIA URNS QL MICRO: ABNORMAL /HPF
BASOPHILS # BLD: 0.09 K/UL (ref 0–0.1)
BASOPHILS NFR BLD: 1.1 % (ref 0–2)
BILIRUB SERPL-MCNC: <0.2 MG/DL (ref 0.2–1)
BILIRUB UR QL: NEGATIVE
BUN SERPL-MCNC: 13 MG/DL (ref 6–23)
BUN/CREAT SERPL: 12
CA-I BLD-MCNC: 9 MG/DL (ref 8.5–10.1)
CHLORIDE SERPL-SCNC: 106 MMOL/L (ref 98–107)
CO2 SERPL-SCNC: 24 MMOL/L (ref 21–32)
COLOR UR: YELLOW
CREAT SERPL-MCNC: 1.07 MG/DL (ref 0.6–1.3)
DIFFERENTIAL METHOD BLD: ABNORMAL
EOSINOPHIL # BLD: 0.39 K/UL (ref 0–0.4)
EOSINOPHIL NFR BLD: 4.7 % (ref 0–5)
EPITH CASTS URNS QL MICRO: ABNORMAL /LPF (ref 0–20)
ERYTHROCYTE [DISTWIDTH] IN BLOOD BY AUTOMATED COUNT: 13.3 % (ref 11.6–14.5)
GLOBULIN SER CALC-MCNC: 3.5 G/DL
GLUCOSE SERPL-MCNC: 83 MG/DL (ref 74–108)
GLUCOSE UR STRIP.AUTO-MCNC: NEGATIVE MG/DL
HCT VFR BLD AUTO: 32.1 % (ref 35–45)
HGB BLD-MCNC: 10.1 G/DL (ref 12–16)
HGB UR QL STRIP: NEGATIVE
IMM GRANULOCYTES # BLD AUTO: 0.02 K/UL (ref 0–0.04)
IMM GRANULOCYTES NFR BLD AUTO: 0.2 % (ref 0–0.5)
KETONES UR QL STRIP.AUTO: NEGATIVE MG/DL
LEUKOCYTE ESTERASE UR QL STRIP.AUTO: ABNORMAL
LYMPHOCYTES # BLD: 2.05 K/UL (ref 0.9–3.6)
LYMPHOCYTES NFR BLD: 24.5 % (ref 21–52)
MCH RBC QN AUTO: 29.7 PG (ref 24–34)
MCHC RBC AUTO-ENTMCNC: 31.5 G/DL (ref 31–37)
MCV RBC AUTO: 94.4 FL (ref 78–100)
MONOCYTES # BLD: 0.65 K/UL (ref 0.05–1.2)
MONOCYTES NFR BLD: 7.8 % (ref 3–10)
NEUTS SEG # BLD: 5.17 K/UL (ref 1.8–8)
NEUTS SEG NFR BLD: 61.7 % (ref 40–73)
NITRITE UR QL STRIP.AUTO: NEGATIVE
NRBC # BLD: 0 K/UL (ref 0–0.01)
NRBC BLD-RTO: 0 PER 100 WBC
PH UR STRIP: 5.5 (ref 5–8)
PLATELET # BLD AUTO: 296 K/UL (ref 135–420)
PMV BLD AUTO: 11 FL (ref 9.2–11.8)
POTASSIUM SERPL-SCNC: 3.4 MMOL/L (ref 3.5–5.5)
PROT SERPL-MCNC: 7 G/DL (ref 6.4–8.2)
PROT UR STRIP-MCNC: NEGATIVE MG/DL
RBC # BLD AUTO: 3.4 M/UL (ref 4.2–5.3)
RBC #/AREA URNS HPF: ABNORMAL /HPF (ref 0–2)
SODIUM SERPL-SCNC: 141 MMOL/L (ref 136–145)
SP GR UR REFRACTOMETRY: 1.01 (ref 1–1.03)
TSH, 3RD GENERATION: 1.02 UIU/ML (ref 0.27–4.2)
UROBILINOGEN UR QL STRIP.AUTO: 0.2 EU/DL (ref 0.2–1)
WBC # BLD AUTO: 8.4 K/UL (ref 4.6–13.2)
WBC URNS QL MICRO: ABNORMAL /HPF (ref 0–4)

## 2025-06-06 PROCEDURE — 82728 ASSAY OF FERRITIN: CPT

## 2025-06-06 PROCEDURE — 81001 URINALYSIS AUTO W/SCOPE: CPT

## 2025-06-06 PROCEDURE — 36415 COLL VENOUS BLD VENIPUNCTURE: CPT

## 2025-06-06 PROCEDURE — 84443 ASSAY THYROID STIM HORMONE: CPT

## 2025-06-06 PROCEDURE — 80053 COMPREHEN METABOLIC PANEL: CPT

## 2025-06-06 PROCEDURE — 84439 ASSAY OF FREE THYROXINE: CPT

## 2025-06-06 PROCEDURE — 85025 COMPLETE CBC W/AUTO DIFF WBC: CPT

## 2025-06-07 LAB
FERRITIN SERPL-MCNC: 46 NG/ML (ref 13–400)
T4 FREE SERPL-MCNC: 1.2 NG/DL (ref 0.9–1.7)

## 2025-06-19 RX ORDER — SODIUM CHLORIDE 0.9 % (FLUSH) 0.9 %
5-40 SYRINGE (ML) INJECTION EVERY 12 HOURS SCHEDULED
Status: CANCELLED | OUTPATIENT
Start: 2025-07-07

## 2025-06-19 RX ORDER — SODIUM CHLORIDE 0.9 % (FLUSH) 0.9 %
5-40 SYRINGE (ML) INJECTION PRN
Status: CANCELLED | OUTPATIENT
Start: 2025-07-07

## 2025-06-23 ENCOUNTER — ANESTHESIA EVENT (OUTPATIENT)
Age: 65
End: 2025-06-23
Payer: COMMERCIAL

## 2025-06-26 ENCOUNTER — HOSPITAL ENCOUNTER (EMERGENCY)
Age: 65
Discharge: HOME | End: 2025-06-26
Attending: STUDENT IN AN ORGANIZED HEALTH CARE EDUCATION/TRAINING PROGRAM
Payer: COMMERCIAL

## 2025-06-26 ENCOUNTER — APPOINTMENT (OUTPATIENT)
Age: 65
End: 2025-06-26
Payer: COMMERCIAL

## 2025-06-26 ENCOUNTER — TELEPHONE (OUTPATIENT)
Age: 65
End: 2025-06-26

## 2025-06-26 VITALS
RESPIRATION RATE: 22 BRPM | OXYGEN SATURATION: 97 % | SYSTOLIC BLOOD PRESSURE: 166 MMHG | HEART RATE: 77 BPM | DIASTOLIC BLOOD PRESSURE: 78 MMHG | TEMPERATURE: 98.4 F

## 2025-06-26 DIAGNOSIS — S01.511A LIP LACERATION, INITIAL ENCOUNTER: ICD-10-CM

## 2025-06-26 DIAGNOSIS — S09.90XA CLOSED HEAD INJURY, INITIAL ENCOUNTER: Primary | ICD-10-CM
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
## 2025-06-26 LAB
ALBUMIN SERPL-MCNC: 3.9 G/DL (ref 3.4–5)
ALBUMIN/GLOB SERPL: 0.9
ALP SERPL-CCNC: 97 U/L (ref 45–117)
ALT SERPL-CCNC: 12 U/L (ref 10–35)
ANION GAP SERPL CALC-SCNC: 11 MMOL/L
AST SERPL W P-5'-P-CCNC: 16 U/L (ref 10–38)
BASOPHILS # BLD: 0.09 K/UL (ref 0–0.1)
BASOPHILS NFR BLD: 1.2 % (ref 0–2)
BILIRUB SERPL-MCNC: 0.2 MG/DL (ref 0.2–1)
BUN SERPL-MCNC: 21 MG/DL (ref 6–23)
BUN/CREAT SERPL: 18
CA-I BLD-MCNC: 10.4 MG/DL (ref 8.5–10.1)
CHLORIDE SERPL-SCNC: 102 MMOL/L (ref 98–107)
CO2 SERPL-SCNC: 26 MMOL/L (ref 21–32)
CREAT SERPL-MCNC: 1.18 MG/DL (ref 0.6–1.3)
DIFFERENTIAL METHOD BLD: NORMAL
EOSINOPHIL # BLD: 0.24 K/UL (ref 0–0.4)
EOSINOPHIL NFR BLD: 3.3 % (ref 0–5)
ERYTHROCYTE [DISTWIDTH] IN BLOOD BY AUTOMATED COUNT: 12.8 % (ref 11.6–14.5)
GLOBULIN SER CALC-MCNC: 4.1 G/DL
GLUCOSE SERPL-MCNC: 92 MG/DL (ref 74–108)
HCT VFR BLD AUTO: 38.8 % (ref 35–45)
HGB BLD-MCNC: 12.3 G/DL (ref 12–16)
IMM GRANULOCYTES # BLD AUTO: 0.03 K/UL (ref 0–0.04)
IMM GRANULOCYTES NFR BLD AUTO: 0.4 % (ref 0–0.5)
LYMPHOCYTES # BLD: 1.99 K/UL (ref 0.9–3.6)
LYMPHOCYTES NFR BLD: 27.1 % (ref 21–52)
MCH RBC QN AUTO: 29.1 PG (ref 24–34)
MCHC RBC AUTO-ENTMCNC: 31.7 G/DL (ref 31–37)
MCV RBC AUTO: 91.7 FL (ref 78–100)
MONOCYTES # BLD: 0.5 K/UL (ref 0.05–1.2)
MONOCYTES NFR BLD: 6.8 % (ref 3–10)
NEUTS SEG # BLD: 4.49 K/UL (ref 1.8–8)
NEUTS SEG NFR BLD: 61.2 % (ref 40–73)
NRBC # BLD: 0 K/UL (ref 0–0.01)
NRBC BLD-RTO: 0 PER 100 WBC
PLATELET # BLD AUTO: 269 K/UL (ref 135–420)
PMV BLD AUTO: 11 FL (ref 9.2–11.8)
POTASSIUM SERPL-SCNC: 4.5 MMOL/L (ref 3.5–5.5)
PROT SERPL-MCNC: 8 G/DL (ref 6.4–8.2)
RBC # BLD AUTO: 4.23 M/UL (ref 4.2–5.3)
SODIUM SERPL-SCNC: 139 MMOL/L (ref 136–145)
WBC # BLD AUTO: 7.3 K/UL (ref 4.6–13.2)

## 2025-06-26 PROCEDURE — 6370000000 HC RX 637 (ALT 250 FOR IP): Performed by: STUDENT IN AN ORGANIZED HEALTH CARE EDUCATION/TRAINING PROGRAM

## 2025-06-26 PROCEDURE — 99285 EMERGENCY DEPT VISIT HI MDM: CPT

## 2025-06-26 PROCEDURE — 71045 X-RAY EXAM CHEST 1 VIEW: CPT

## 2025-06-26 PROCEDURE — 93005 ELECTROCARDIOGRAM TRACING: CPT | Performed by: STUDENT IN AN ORGANIZED HEALTH CARE EDUCATION/TRAINING PROGRAM

## 2025-06-26 PROCEDURE — 85025 COMPLETE CBC W/AUTO DIFF WBC: CPT

## 2025-06-26 PROCEDURE — 90715 TDAP VACCINE 7 YRS/> IM: CPT | Performed by: STUDENT IN AN ORGANIZED HEALTH CARE EDUCATION/TRAINING PROGRAM

## 2025-06-26 PROCEDURE — 90471 IMMUNIZATION ADMIN: CPT | Performed by: STUDENT IN AN ORGANIZED HEALTH CARE EDUCATION/TRAINING PROGRAM

## 2025-06-26 PROCEDURE — 6360000002 HC RX W HCPCS: Performed by: STUDENT IN AN ORGANIZED HEALTH CARE EDUCATION/TRAINING PROGRAM

## 2025-06-26 PROCEDURE — 80053 COMPREHEN METABOLIC PANEL: CPT

## 2025-06-26 PROCEDURE — 70450 CT HEAD/BRAIN W/O DYE: CPT

## 2025-06-26 RX ORDER — ACETAMINOPHEN 325 MG/1
650 TABLET ORAL
Status: COMPLETED | OUTPATIENT
Start: 2025-06-26 | End: 2025-06-26

## 2025-06-26 RX ORDER — SODIUM CHLORIDE, SODIUM LACTATE, POTASSIUM CHLORIDE, AND CALCIUM CHLORIDE .6; .31; .03; .02 G/100ML; G/100ML; G/100ML; G/100ML
1000 INJECTION, SOLUTION INTRAVENOUS ONCE
Status: DISCONTINUED | OUTPATIENT
Start: 2025-06-26 | End: 2025-06-26

## 2025-06-26 RX ADMIN — ACETAMINOPHEN 650 MG: 325 TABLET ORAL at 13:14

## 2025-06-26 RX ADMIN — TETANUS TOXOID, REDUCED DIPHTHERIA TOXOID AND ACELLULAR PERTUSSIS VACCINE, ADSORBED 0.5 ML: 5; 2.5; 8; 8; 2.5 SUSPENSION INTRAMUSCULAR at 14:43

## 2025-06-26 ASSESSMENT — ENCOUNTER SYMPTOMS
DIARRHEA: 0
ABDOMINAL PAIN: 0
VOMITING: 0
EYE PAIN: 0
NAUSEA: 0
BACK PAIN: 0
SHORTNESS OF BREATH: 0
CHEST TIGHTNESS: 0

## 2025-06-26 NOTE — ED PROVIDER NOTES
[facility] EMERGENCY DEPT  EMERGENCY DEPARTMENT HISTORY AND PHYSICAL EXAM      Date of evaluation: 6/26/2025  Patient Name: Chichi Ocasio  Birthdate 1960  MRN: [id number]  ED Provider: Jerman Jimenez MD   Note Started: 10:39 AM EDT 6/26/25    HISTORY OF PRESENT ILLNESS     Chief Complaint   Patient presents with    Fall    Oral Swelling       History Provided By: Patient, only and daughter/son     HPI: Chichi Ocasio is a 64 y.o. female with hx of HTN, GERD, anxiety here for eval after a fall. Fell and hit head from ground level. Has a mild HA and cut lip. No other traumatic injuries. Unclear how fell. Cannot remember if passed out or tripped. No weakness, numbness, speech changes.    REVIEW OF SYSTEMS   Review of Systems   Constitutional:  Negative for chills and fever.   HENT:  Negative for congestion.    Eyes:  Negative for pain and visual disturbance.   Respiratory:  Negative for chest tightness and shortness of breath.    Cardiovascular:  Negative for chest pain.   Gastrointestinal:  Negative for abdominal pain, diarrhea, nausea and vomiting.   Genitourinary:  Negative for dysuria and frequency.   Musculoskeletal:  Negative for back pain.   Skin:         Cut to lip   Neurological:  Positive for headaches. Negative for weakness and numbness.   Psychiatric/Behavioral:  Negative for confusion.         Positives and Pertinent negatives as per HPI.    PAST MEDICAL HISTORY   Past Medical History:  Past Medical History:   Diagnosis Date    Anxiety     Chronic sinusitis     Endocrine disorder     GERD (gastroesophageal reflux disease)     History of recurrent UTIs     Hypertension     Low back pain     Myeloradiculopathy     Neck pain     Renal cyst        Past Surgical History:  Past Surgical History:   Procedure Laterality Date    CERVICAL FUSION      Fusion and decompression    CHOLECYSTECTOMY, LAPAROSCOPIC N/A 05/10/2024    CHOLECYSTECTOMY LAPAROSCOPIC performed by Helen Hodgson DO at [facility] MAIN OR    HEMORRHOID

## 2025-06-26 NOTE — TELEPHONE ENCOUNTER
Patients  stated she can not make her pre op appt today. Patient is in the hospital due to busted open mouth. Patients pre op got moved to 6/30/25.

## 2025-06-26 NOTE — ED NOTES
Patient ambulated to bathroom and back without assistance.    Standing  135 84  HR 70    Sitting  131 88  HR 70    Laying   87  hr 65

## 2025-06-26 NOTE — ED NOTES
Patient consented to lab work being drawn but declines IV and IV fluids. MD aware. Family at bedside.

## 2025-06-26 NOTE — ED TRIAGE NOTES
States that she was on her way to Dr Louise's office this morning and fell at home. No active bleeding noted. Top lip center with pea sized abrasion, mild edema, no bleeding.

## 2025-06-27 LAB
EKG ATRIAL RATE: 58 BPM
EKG DIAGNOSIS: NORMAL
EKG P AXIS: 61 DEGREES
EKG P-R INTERVAL: 170 MS
EKG Q-T INTERVAL: 406 MS
EKG QRS DURATION: 86 MS
EKG QTC CALCULATION (BAZETT): 398 MS
EKG R AXIS: 36 DEGREES
EKG T AXIS: 52 DEGREES
EKG VENTRICULAR RATE: 58 BPM

## 2025-06-29 NOTE — DISCHARGE INSTRUCTIONS
[phone]. If you need assistance after hours please contact our office to be connected with the provider on call or you may contact the answering service directly at [phone].  If Dr. Louise or his staff do not call you back within 30 minutes.  Please tell the  to try again.    Phone: [phone]                    [email or web address]

## 2025-06-30 ENCOUNTER — OFFICE VISIT (OUTPATIENT)
Age: 65
End: 2025-06-30
Payer: COMMERCIAL

## 2025-06-30 ENCOUNTER — HOSPITAL ENCOUNTER (OUTPATIENT)
Age: 65
Discharge: HOME | End: 2025-07-03

## 2025-06-30 DIAGNOSIS — I10 HYPERTENSION, UNSPECIFIED TYPE: ICD-10-CM

## 2025-06-30 DIAGNOSIS — M65.312 TRIGGER THUMB OF LEFT HAND: Primary | ICD-10-CM

## 2025-06-30 PROCEDURE — 99214 OFFICE O/P EST MOD 30 MIN: CPT | Performed by: ORTHOPAEDIC SURGERY

## 2025-06-30 PROCEDURE — 1123F ACP DISCUSS/DSCN MKR DOCD: CPT | Performed by: ORTHOPAEDIC SURGERY

## 2025-06-30 RX ORDER — ONDANSETRON 8 MG/1
8 TABLET, ORALLY DISINTEGRATING ORAL EVERY 8 HOURS PRN
Qty: 20 TABLET | Refills: 0 | Status: SHIPPED | OUTPATIENT
Start: 2025-06-30

## 2025-06-30 RX ORDER — HYDROMORPHONE HYDROCHLORIDE 2 MG/1
2 TABLET ORAL
Qty: 30 TABLET | Refills: 0 | Status: SHIPPED | OUTPATIENT
Start: 2025-06-30 | End: 2025-07-07

## 2025-06-30 NOTE — H&P (VIEW-ONLY)
Name: Chichi Ocasio    : 1960     [facility]  [address]  [facility] [address]  Dept: [phone]  Dept Fax: [phone]   Chief Complaint   Patient presents with    Pre-op Exam    Finger Pain     There were no vitals taken for this visit.   Allergies   Allergen Reactions    Aspirin      Other reaction(s): Unknown (comments)    Ciprofloxacin      Other reaction(s): Drowsiness    Citalopram      Other reaction(s): Unknown (comments)    Clarithromycin      Other reaction(s): Drowsiness    Clindamycin      Other reaction(s): Unknown (comments)    Doxycycline      Other reaction(s): Unknown (comments)    Doxycycline Calcium      Other reaction(s): Unknown (comments)    Levofloxacin      Other reaction(s): Unknown (comments)    Moxifloxacin      Other reaction(s): Unknown (comments)    Nitrofurantoin      Other reaction(s): Unknown (comments)    Oseltamivir      Other reaction(s): Unknown (comments)    Oxycodone-Acetaminophen      Other reaction(s): Unknown (comments)    Penicillins      Other reaction(s): Unknown (comments)    Pravastatin      Other reaction(s): Unknown (comments)    Sulfa Antibiotics      Other reaction(s): Drowsiness    Sulfamethoxazole-Trimethoprim      Other reaction(s): Unknown (comments)    Tetracycline      Other reaction(s): Unknown (comments)    Vancomycin      Other reaction(s): Unknown (comments)     Current Outpatient Medications   Medication Sig Dispense Refill    ondansetron (ZOFRAN-ODT) 8 MG TBDP disintegrating tablet Take 1 tablet by mouth every 8 hours as needed for Nausea or Vomiting TAKE EVERY 8 HOURS PRN NAUSEA / Do Not start until after surgery / dispense 20 tabs 20 tablet 0    HYDROmorphone (DILAUDID) 2 MG tablet Take 1 tablet by mouth every 4-6 hours as needed for Pain for up to 7 days. DO NOT START PAIN MEDICATION UNTIL AFTER SURGERY! Max Daily Amount: 12 mg 30 tablet

## 2025-06-30 NOTE — PROGRESS NOTES
Name: Chichi Ocasio    : 1960     [facility]THOP[facility]ICINE  [address], [facility] [[email or web address] A  [facility] [address]  Dept: [phone]  Dept Fax: [phone]   Chief Complaint   Patient presents with    Pre-op Exam    Finger Pain     There were no vitals taken for this visit.   Allergies   Allergen Reactions    Aspirin      Other reaction(s): Unknown (comments)    Ciprofloxacin      Other reaction(s): Drowsiness    Citalopram      Other reaction(s): Unknown (comments)    Clarithromycin      Other reaction(s): Drowsiness    Clindamycin      Other reaction(s): Unknown (comments)    Doxycycline      Other reaction(s): Unknown (comments)    Doxycycline Calcium      Other reaction(s): Unknown (comments)    Levofloxacin      Other reaction(s): Unknown (comments)    Moxifloxacin      Other reaction(s): Unknown (comments)    Nitrofurantoin      Other reaction(s): Unknown (comments)    Oseltamivir      Other reaction(s): Unknown (comments)    Oxycodone-Acetaminophen      Other reaction(s): Unknown (comments)    Penicillins      Other reaction(s): Unknown (comments)    Pravastatin      Other reaction(s): Unknown (comments)    Sulfa Antibiotics      Other reaction(s): Drowsiness    Sulfamethoxazole-Trimethoprim      Other reaction(s): Unknown (comments)    Tetracycline      Other reaction(s): Unknown (comments)    Vancomycin      Other reaction(s): Unknown (comments)     Current Outpatient Medications   Medication Sig Dispense Refill    ondansetron (ZOFRAN-ODT) 8 MG TBDP disintegrating tablet Take 1 tablet by mouth every 8 hours as needed for Nausea or Vomiting TAKE EVERY 8 HOURS PRN NAUSEA / Do Not start until after surgery / dispense 20 tabs 20 tablet 0    HYDROmorphone (DILAUDID) 2 MG tablet Take 1 tablet by mouth every 4-6 hours as needed for Pain for up to 7 days. DO NOT START PAIN MEDICATION UNTIL AFTER SURGERY! Max Daily Amount: 12 mg 30 tablet

## 2025-07-02 RX ORDER — SODIUM CHLORIDE 9 MG/ML
INJECTION, SOLUTION INTRAVENOUS PRN
Status: CANCELLED | OUTPATIENT
Start: 2025-07-02

## 2025-07-02 RX ORDER — SODIUM CHLORIDE 0.9 % (FLUSH) 0.9 %
5-40 SYRINGE (ML) INJECTION EVERY 12 HOURS SCHEDULED
Status: CANCELLED | OUTPATIENT
Start: 2025-07-02

## 2025-07-07 ENCOUNTER — ANESTHESIA (OUTPATIENT)
Age: 65
End: 2025-07-07
Payer: COMMERCIAL

## 2025-07-07 ENCOUNTER — HOSPITAL ENCOUNTER (OUTPATIENT)
Age: 65
Setting detail: OUTPATIENT SURGERY
Discharge: HOME | End: 2025-07-07
Attending: ORTHOPAEDIC SURGERY | Admitting: ORTHOPAEDIC SURGERY
Payer: COMMERCIAL

## 2025-07-07 VITALS
WEIGHT: 137.7 LBS | RESPIRATION RATE: 17 BRPM | DIASTOLIC BLOOD PRESSURE: 79 MMHG | SYSTOLIC BLOOD PRESSURE: 137 MMHG | HEART RATE: 59 BPM | BODY MASS INDEX: 21.61 KG/M2 | OXYGEN SATURATION: 99 % | HEIGHT: 67 IN | TEMPERATURE: 97.7 F

## 2025-07-07 PROCEDURE — 7100000010 HC PHASE II RECOVERY - FIRST 15 MIN: Performed by: ORTHOPAEDIC SURGERY

## 2025-07-07 PROCEDURE — 6360000002 HC RX W HCPCS: Performed by: NURSE ANESTHETIST, CERTIFIED REGISTERED

## 2025-07-07 PROCEDURE — 2500000003 HC RX 250 WO HCPCS

## 2025-07-07 PROCEDURE — 6370000000 HC RX 637 (ALT 250 FOR IP)

## 2025-07-07 PROCEDURE — 3600000002 HC SURGERY LEVEL 2 BASE: Performed by: ORTHOPAEDIC SURGERY

## 2025-07-07 PROCEDURE — 2580000003 HC RX 258: Performed by: ORTHOPAEDIC SURGERY

## 2025-07-07 PROCEDURE — 7100000011 HC PHASE II RECOVERY - ADDTL 15 MIN: Performed by: ORTHOPAEDIC SURGERY

## 2025-07-07 PROCEDURE — 2500000003 HC RX 250 WO HCPCS: Performed by: ORTHOPAEDIC SURGERY

## 2025-07-07 PROCEDURE — 6360000002 HC RX W HCPCS

## 2025-07-07 PROCEDURE — 3700000000 HC ANESTHESIA ATTENDED CARE: Performed by: ORTHOPAEDIC SURGERY

## 2025-07-07 PROCEDURE — 6360000002 HC RX W HCPCS: Performed by: ORTHOPAEDIC SURGERY

## 2025-07-07 PROCEDURE — 2709999900 HC NON-CHARGEABLE SUPPLY: Performed by: ORTHOPAEDIC SURGERY

## 2025-07-07 PROCEDURE — 3700000001 HC ADD 15 MINUTES (ANESTHESIA): Performed by: ORTHOPAEDIC SURGERY

## 2025-07-07 PROCEDURE — 3600000012 HC SURGERY LEVEL 2 ADDTL 15MIN: Performed by: ORTHOPAEDIC SURGERY

## 2025-07-07 RX ORDER — CELECOXIB 200 MG/1
200 CAPSULE ORAL ONCE
Status: COMPLETED | OUTPATIENT
Start: 2025-07-07 | End: 2025-07-07

## 2025-07-07 RX ORDER — TRAMADOL HYDROCHLORIDE 50 MG/1
50 TABLET ORAL EVERY 6 HOURS PRN
Status: DISCONTINUED | OUTPATIENT
Start: 2025-07-07 | End: 2025-07-07 | Stop reason: HOSPADM

## 2025-07-07 RX ORDER — ONDANSETRON 2 MG/ML
4 INJECTION INTRAMUSCULAR; INTRAVENOUS EVERY 6 HOURS PRN
Status: DISCONTINUED | OUTPATIENT
Start: 2025-07-07 | End: 2025-07-07 | Stop reason: HOSPADM

## 2025-07-07 RX ORDER — LIDOCAINE HYDROCHLORIDE 20 MG/ML
INJECTION, SOLUTION EPIDURAL; INFILTRATION; INTRACAUDAL; PERINEURAL PRN
Status: DISCONTINUED | OUTPATIENT
Start: 2025-07-07 | End: 2025-07-07 | Stop reason: ALTCHOICE

## 2025-07-07 RX ORDER — ONDANSETRON 2 MG/ML
INJECTION INTRAMUSCULAR; INTRAVENOUS
Status: DISCONTINUED | OUTPATIENT
Start: 2025-07-07 | End: 2025-07-07 | Stop reason: SDUPTHER

## 2025-07-07 RX ORDER — ONDANSETRON 4 MG/1
8 TABLET, ORALLY DISINTEGRATING ORAL EVERY 8 HOURS PRN
Status: DISCONTINUED | OUTPATIENT
Start: 2025-07-07 | End: 2025-07-07 | Stop reason: HOSPADM

## 2025-07-07 RX ORDER — SODIUM CHLORIDE 0.9 % (FLUSH) 0.9 %
5-40 SYRINGE (ML) INJECTION PRN
Status: DISCONTINUED | OUTPATIENT
Start: 2025-07-07 | End: 2025-07-07 | Stop reason: HOSPADM

## 2025-07-07 RX ORDER — SODIUM CHLORIDE, SODIUM LACTATE, POTASSIUM CHLORIDE, CALCIUM CHLORIDE 600; 310; 30; 20 MG/100ML; MG/100ML; MG/100ML; MG/100ML
INJECTION, SOLUTION INTRAVENOUS CONTINUOUS
Status: DISCONTINUED | OUTPATIENT
Start: 2025-07-07 | End: 2025-07-07 | Stop reason: HOSPADM

## 2025-07-07 RX ORDER — TRAMADOL HYDROCHLORIDE 50 MG/1
100 TABLET ORAL EVERY 6 HOURS PRN
Status: DISCONTINUED | OUTPATIENT
Start: 2025-07-07 | End: 2025-07-07 | Stop reason: HOSPADM

## 2025-07-07 RX ORDER — PROPOFOL 10 MG/ML
INJECTION, EMULSION INTRAVENOUS
Status: DISCONTINUED | OUTPATIENT
Start: 2025-07-07 | End: 2025-07-07 | Stop reason: SDUPTHER

## 2025-07-07 RX ORDER — ACETAMINOPHEN 500 MG
1000 TABLET ORAL EVERY 6 HOURS PRN
Status: DISCONTINUED | OUTPATIENT
Start: 2025-07-07 | End: 2025-07-07 | Stop reason: HOSPADM

## 2025-07-07 RX ORDER — MIDAZOLAM HYDROCHLORIDE 1 MG/ML
INJECTION, SOLUTION INTRAMUSCULAR; INTRAVENOUS
Status: DISCONTINUED | OUTPATIENT
Start: 2025-07-07 | End: 2025-07-07 | Stop reason: SDUPTHER

## 2025-07-07 RX ADMIN — MIDAZOLAM 2 MG: 1 INJECTION INTRAMUSCULAR; INTRAVENOUS at 08:57

## 2025-07-07 RX ADMIN — PROPOFOL 50 MG: 10 INJECTION, EMULSION INTRAVENOUS at 09:10

## 2025-07-07 RX ADMIN — WATER 2000 MG: 1 INJECTION INTRAMUSCULAR; INTRAVENOUS; SUBCUTANEOUS at 08:57

## 2025-07-07 RX ADMIN — CELECOXIB 200 MG: 200 CAPSULE ORAL at 07:40

## 2025-07-07 RX ADMIN — ONDANSETRON 4 MG: 2 INJECTION, SOLUTION INTRAMUSCULAR; INTRAVENOUS at 09:15

## 2025-07-07 RX ADMIN — PROPOFOL 100 MG: 10 INJECTION, EMULSION INTRAVENOUS at 08:58

## 2025-07-07 RX ADMIN — PROPOFOL 50 MG: 10 INJECTION, EMULSION INTRAVENOUS at 09:05

## 2025-07-07 ASSESSMENT — PAIN - FUNCTIONAL ASSESSMENT
PAIN_FUNCTIONAL_ASSESSMENT: NONE - DENIES PAIN

## 2025-07-07 NOTE — ANESTHESIA PRE PROCEDURE
Department of Anesthesiology  Preprocedure Note       Name:  Chichi Ocasio   Age:  64 y.o.  :  1960                                          MRN:  [id number]         Date:  2025      Surgeon: Surgeon(s):  Alex Louise MD    Procedure: Procedure(s):  LEFT THUMB TRIGGER RELEASE    Medications prior to admission:   Prior to Admission medications    Medication Sig Start Date End Date Taking? Authorizing Provider   estradiol (ESTRACE) 1 MG tablet Take 1 tablet by mouth daily 24  Yes Lida Stanley PA-C   cetirizine (ZYRTEC) 10 MG tablet Take 1 tablet by mouth 2 times daily as needed for Allergies 24  Yes Provider, Muna, MD   folic acid (FOLVITE) 1 MG tablet Take 1 tablet by mouth daily 3/25/24  Yes Provider, Historical, MD   lisinopril (PRINIVIL;ZESTRIL) 40 MG tablet Take 1 tablet by mouth daily 24  Yes Provider, MD Muna   acetaminophen (TYLENOL) 325 MG tablet Take by mouth every 4 hours as needed   Yes Automatic Reconciliation, Ar   albuterol sulfate HFA (PROAIR HFA) 108 (90 Base) MCG/ACT inhaler Inhale 2 puffs into the lungs every 4 hours as needed for Shortness of Breath or Wheezing ceived the following from [facility]: Outside name: PROAIR HFA 90 mcg/actuation inhaler 10/7/15  Yes Automatic Reconciliation, Ar   ALPRAZolam (XANAX) 0.5 MG tablet Take 1 tablet by mouth 3 times daily as needed for Anxiety. ceived the following from [facility]: Outside name: ALPRAZolam (XANAX) 0.5 mg tablet 9/26/15  Yes Automatic Reconciliation, Ar   buPROPion (WELLBUTRIN XL) 150 MG extended release tablet Take 1 tablet by mouth every morning 20  Yes Automatic Reconciliation, Ar   fluticasone (FLONASE) 50 MCG/ACT nasal spray 2 sprays by Nasal route daily ceived the following from [facility]: Outside name: fluticasone (FLONASE) 50 mcg/actuation nasal spray 10/23/15  Yes Automatic Reconciliation, Ar   pantoprazole (PROTONIX) 40 MG tablet Take 
\"LABANTI\"    Drug/Infectious Status (If Applicable):  No results found for: \"HIV\", \"HEPCAB\"    COVID-19 Screening (If Applicable): No results found for: \"COVID19\"        Anesthesia Evaluation  Patient summary reviewed and Nursing notes reviewed   no history of anesthetic complications:   Airway: Mallampati: II  TM distance: >3 FB   Neck ROM: full  Mouth opening: > = 3 FB   Dental: normal exam         Pulmonary:Negative Pulmonary ROS and normal exam  breath sounds clear to auscultation  (+)           asthma:                            Cardiovascular:Negative CV ROS    (+) hypertension:      NYHA Classification: II    Rhythm: regular  Rate: normal                    Neuro/Psych:   Negative Neuro/Psych ROS  (+) depression/anxiety             GI/Hepatic/Renal: Neg GI/Hepatic/Renal ROS  (+) GERD:          Endo/Other: Negative Endo/Other ROS                    Abdominal:             Vascular: negative vascular ROS.         Other Findings:       Anesthesia Plan      general     ASA 2       Induction: inhalational and intravenous.  continuous noninvasive hemodynamic monitor  MIPS: Postoperative opioids intended and Prophylactic antiemetics administered.  Anesthetic plan and risks discussed with patient.    Use of blood products discussed with patient whom consented to blood products.    Plan discussed with CRNA.                YVON Younger - NARCISO   7/7/2025

## 2025-07-07 NOTE — PROGRESS NOTES
Pt has multiple allergies listed which she denies any recollection of or what type reaction takes place. Dr. Dani ramirez and Ancef 2gms was ordered with constant observation. AL ramirez.

## 2025-07-07 NOTE — ANESTHESIA POSTPROCEDURE EVALUATION
Department of Anesthesiology  Postprocedure Note    Patient: Chichi Ocasio  MRN: [id number]  YOB: 1960  Date of evaluation: 7/7/2025    Procedure Summary       Date: 07/07/25 Room / Location: [facility] MAIN 02 / [facility] MAIN OR    Anesthesia Start: 0855 Anesthesia Stop: 0920    Procedure: LEFT THUMB TRIGGER RELEASE (Left: Hand) Diagnosis:       Trigger thumb of left hand      (Trigger thumb of left hand [M65.312])    Surgeons: Alex Louise MD Responsible Provider: Bruce Moreno APRN - CRNA    Anesthesia Type: MAC, TIVA ASA Status: 2            Anesthesia Type: MAC, TIVA    Miriam Phase I: Miriam Score: 10    Miriam Phase II:      Anesthesia Post Evaluation    Patient location during evaluation: bedside  Patient participation: complete - patient participated  Level of consciousness: awake and awake and alert  Pain score: 0  Airway patency: patent  Nausea & Vomiting: no nausea  Cardiovascular status: blood pressure returned to baseline  Respiratory status: acceptable  Hydration status: euvolemic  Multimodal analgesia pain management approach  Pain management: adequate    No notable events documented.

## 2025-07-07 NOTE — INTERVAL H&P NOTE
Update History & Physical    The Patient's History and Physical was reviewed with the patient.  The patient was examined.  There was no change.  The surgical site was confirmed by the patient and me.    Patient understands and wants to proceed with the procedure.    If applicable, I have discussed with the patient / power of  the rationale for blood component transfusion; its benefits in treating or preventing fatigue, organ damage, or death; and its risk which includes mild transfusion reactions, rare risk of blood borne infection, or more serious but rare reactions. I have discussed the alternatives to transfusion, including the risk and consequences of not receiving transfusion. The patient / power of  had an opportunity to ask questions and had agreed to proceed with transfusion of blood components.     Plan:  The risk, benefits, expected outcome, and alternative to the recommended procedure have been discussed with the patient.     
Statement Selected
full weight-bearing

## 2025-07-07 NOTE — OP NOTE
Operative Note    Patient: Chichi Ocasio MRN: [id number]  Surgery Date: 7/7/2025           Procedure  Primary Surgeon    LEFT THUMB TRIGGER RELEASE  Alex Louise MD    * Panel 2 does not exist *  * Panel 2 does not exist *    * Panel 3 does not exist *  * Panel 3 does not exist *     Surgeons and Role:     * Alex Louise MD - Primary    Other OR Staff/Assistants:  Circulator: Araceli Schwartz RN  First Assistant: Shannan Velarde RN  Scrub Person First: Gisele Karimi  Perioperative Nurse: Araceli Bowden RN    1st Assistant Tasks:  Closing    Pre-operative Diagnosis:  Trigger thumb of left hand [M65.312]    Post-operative Diagnosis: same as preop diagnosis    Anesthesia Type: IV Sedation     Findings: Trigger finger    Complications: No    EBL: 5CC    Specimens: None    Operative procedure: Trigger finger release    Operative time-out was performed.  Subsequently, the extremity was prepped and draped in a sterile fashion.  After adequate anesthesia was given, longitudinal incision over the volar aspect of the MCP joint over the A1 pulley was made approximately 1 cm in length.  Blunt dissection performed down to the A1 pulley. At that point, a small rent was made.  Proximal and distal release performed using small scissors.  Adequate release was assessed using a [place].  There was no evidence of any other pathology. Copious irrigation was performed.  Skin was closed with 3-0 nylon.  Compressive dressing was applied.  The patient was taken to PACU in stable condition.    Alex Louise MD

## 2025-07-28 ENCOUNTER — OFFICE VISIT (OUTPATIENT)
Age: 65
End: 2025-07-28

## 2025-07-28 DIAGNOSIS — Z98.890 S/P TRIGGER FINGER RELEASE: Primary | ICD-10-CM

## 2025-07-28 PROCEDURE — 99024 POSTOP FOLLOW-UP VISIT: CPT

## 2025-07-28 NOTE — PROGRESS NOTES
(CERVIX STATUS UNKNOWN)  1995?    Total    LUMBAR FUSION      LUMBAR LAMINECTOMY      OTHER SURGICAL HISTORY      foot surgery    OTHER SURGICAL HISTORY      nasal surgery    OVARY REMOVAL        Past Medical History:   Diagnosis Date    Anxiety     Chronic sinusitis     Endocrine disorder     GERD (gastroesophageal reflux disease)     History of recurrent UTIs     Hypertension     Low back pain     Myeloradiculopathy     Neck pain     Renal cyst         I have reviewed and agree with PFSH and ROS and intake form in chart and the record furthermore I have reviewed prior medical record(s) regarding this patients care during this appointment.   Review of Systems:   Patient is a pleasant appearing individual, appropriately dressed, well hydrated, well nourished, who is alert, appropriately oriented for age, and in no acute distress with a normal gait and normal affect who does not appear to be in any significant pain.      Physical Exam:  Left Hand - No point tenderness, Full range of motion, No instability, No Weakness, No, skin lesions, No swelling, Grossly neurovascularly intact, well healed incision to left anterior distal thumb.      Encounter Diagnosis   Name Primary?    S/P trigger finger release Yes      History of Present Illness  The patient is a 64-year-old female who presents here status post left thumb trigger finger release on 07/07/2025.    She reports no pain, only occasional soreness or pressure, which she attributes to increased activity.    Physical Exam      Results      Assessment & Plan  1. Postoperative status following left thumb trigger finger release:  Reports no pain, only occasional soreness or pressure, attributed to increased activity. Sutures will be removed during this visit. Advised to start applying Neosporin or other topical ointments and gently massage the scar. Gradual increase in activities is encouraged. The incision can be left open to air unless there are any open or draining

## 2025-08-13 ENCOUNTER — HOSPITAL ENCOUNTER (OUTPATIENT)
Age: 65
Discharge: HOME | End: 2025-08-16
Payer: COMMERCIAL

## 2025-08-13 ENCOUNTER — TRANSCRIBE ORDERS (OUTPATIENT)
Age: 65
End: 2025-08-13

## 2025-08-13 DIAGNOSIS — I10 ESSENTIAL HYPERTENSION, MALIGNANT: ICD-10-CM

## 2025-08-13 DIAGNOSIS — I10 ESSENTIAL HYPERTENSION, MALIGNANT: Primary | ICD-10-CM

## 2025-08-13 DIAGNOSIS — D64.9 RELATIVE ANEMIA: ICD-10-CM

## 2025-08-13 DIAGNOSIS — D50.9 NORMOCYTIC HYPOCHROMIC ANEMIA: ICD-10-CM

## 2025-08-13 LAB
ALBUMIN SERPL-MCNC: 3.6 G/DL (ref 3.4–5)
ALBUMIN/GLOB SERPL: 0.9
ALP SERPL-CCNC: 86 U/L (ref 45–117)
ALT SERPL-CCNC: 8 U/L (ref 10–35)
ANION GAP SERPL CALC-SCNC: 11 MMOL/L (ref 3–18)
AST SERPL W P-5'-P-CCNC: 16 U/L (ref 10–38)
BASOPHILS # BLD: 0.11 K/UL (ref 0–0.1)
BASOPHILS NFR BLD: 1.3 % (ref 0–2)
BILIRUB SERPL-MCNC: <0.2 MG/DL (ref 0.2–1)
BUN SERPL-MCNC: 16 MG/DL (ref 6–23)
BUN/CREAT SERPL: 14
CA-I BLD-MCNC: 9.8 MG/DL (ref 8.5–10.1)
CHLORIDE SERPL-SCNC: 102 MMOL/L (ref 98–107)
CO2 SERPL-SCNC: 25 MMOL/L (ref 21–32)
CREAT SERPL-MCNC: 1.1 MG/DL (ref 0.6–1.3)
DIFFERENTIAL METHOD BLD: ABNORMAL
EOSINOPHIL # BLD: 0.23 K/UL (ref 0–0.4)
EOSINOPHIL NFR BLD: 2.7 % (ref 0–5)
ERYTHROCYTE [DISTWIDTH] IN BLOOD BY AUTOMATED COUNT: 14 % (ref 11.6–14.5)
GLOBULIN SER CALC-MCNC: 4.2 G/DL
GLUCOSE SERPL-MCNC: 91 MG/DL (ref 74–108)
HCT VFR BLD AUTO: 34.4 % (ref 35–45)
HGB BLD-MCNC: 11.3 G/DL (ref 12–16)
IMM GRANULOCYTES # BLD AUTO: 0.02 K/UL (ref 0–0.04)
IMM GRANULOCYTES NFR BLD AUTO: 0.2 % (ref 0–0.5)
LYMPHOCYTES # BLD: 2.02 K/UL (ref 0.9–3.6)
LYMPHOCYTES NFR BLD: 23.5 % (ref 21–52)
MCH RBC QN AUTO: 29.7 PG (ref 24–34)
MCHC RBC AUTO-ENTMCNC: 32.8 G/DL (ref 31–37)
MCV RBC AUTO: 90.3 FL (ref 78–100)
MONOCYTES # BLD: 0.79 K/UL (ref 0.05–1.2)
MONOCYTES NFR BLD: 9.2 % (ref 3–10)
NEUTS SEG # BLD: 5.42 K/UL (ref 1.8–8)
NEUTS SEG NFR BLD: 63.1 % (ref 40–73)
NRBC # BLD: 0 K/UL (ref 0–0.01)
NRBC BLD-RTO: 0 PER 100 WBC
PLATELET # BLD AUTO: 306 K/UL (ref 135–420)
PMV BLD AUTO: 11 FL (ref 9.2–11.8)
POTASSIUM SERPL-SCNC: 4.2 MMOL/L (ref 3.5–5.5)
PROT SERPL-MCNC: 7.7 G/DL (ref 6.4–8.2)
RBC # BLD AUTO: 3.81 M/UL (ref 4.2–5.3)
SODIUM SERPL-SCNC: 138 MMOL/L (ref 136–145)
WBC # BLD AUTO: 8.6 K/UL (ref 4.6–13.2)

## 2025-08-13 PROCEDURE — 36415 COLL VENOUS BLD VENIPUNCTURE: CPT

## 2025-08-13 PROCEDURE — 82728 ASSAY OF FERRITIN: CPT

## 2025-08-13 PROCEDURE — 85025 COMPLETE CBC W/AUTO DIFF WBC: CPT

## 2025-08-13 PROCEDURE — 80053 COMPREHEN METABOLIC PANEL: CPT

## 2025-08-14 LAB — FERRITIN SERPL-MCNC: 82 NG/ML (ref 13–400)

## (undated) DEVICE — GAUZE,SPONGE,4"X4",16PLY,STRL,LF,10/TRAY: Brand: MEDLINE

## (undated) DEVICE — GLOVE,SURG,SENSICARE SLT,LF,PF,6.5: Brand: MEDLINE

## (undated) DEVICE — BANDAGE COMPR W4INXL12FT E DISP ESMARCH EVEN

## (undated) DEVICE — BLADE OPHTH GRN ROUNDED TIP 1 SIDE SHRP GRINDLESS MINI-BLDE

## (undated) DEVICE — GAUZE,SPONGE,4"X4",16PLY,XRAY,STRL,LF: Brand: MEDLINE

## (undated) DEVICE — TUBING INSUFFLATOR HEAT HUMIDIFIED SMK EVAC SET PNEUMOCLEAR

## (undated) DEVICE — GOWN,SIRUS,NONRNF,XLN/XL,20/CS: Brand: MEDLINE

## (undated) DEVICE — PADDING CAST W3INXL4YD SYN NAT PROTOUCH

## (undated) DEVICE — COUNTER NDL 40 COUNT HLD 70 FOAM BLK ADH W/ MAG

## (undated) DEVICE — UNDERGLOVE SURG SZ 7.5 BLU LTX FREE SYN POLYISOPRENE

## (undated) DEVICE — GLOVE SURG SZ 7 L12IN FNGR THK79MIL GRN LTX FREE

## (undated) DEVICE — ZIMMER® STERILE DISPOSABLE TOURNIQUET CUFF WITH PLC, DUAL PORT, SINGLE BLADDER, 18 IN. (46 CM)

## (undated) DEVICE — SUTURE MONOCRYL + SZ 4-0 L27IN ABSRB UD L19MM PS-2 3/8 CIR MCP426H

## (undated) DEVICE — LAPAROSCOPIC SCISSORS: Brand: EPIX LAPAROSCOPIC SCISSORS

## (undated) DEVICE — INTENDED FOR TISSUE SEPARATION, AND OTHER PROCEDURES THAT REQUIRE A SHARP SURGICAL BLADE TO PUNCTURE OR CUT.: Brand: BARD-PARKER SAFETY BLADES SIZE 15, STERILE

## (undated) DEVICE — BLADE,CARBON-STEEL,15,STRL,DISPOSABLE,TB: Brand: MEDLINE

## (undated) DEVICE — 3M™ STERI-STRIP™ REINFORCED ADHESIVE SKIN CLOSURES, R1547, 1/2 IN X 4 IN (12 MM X 100 MM), 6 STRIPS/ENVELOPE: Brand: 3M™ STERI-STRIP™

## (undated) DEVICE — APPLIER CLP M/L SHFT DIA5MM 15 LIG LIGAMAX 5

## (undated) DEVICE — SYSTEM FASCIAL CLOSURE CLASSIC EFX

## (undated) DEVICE — CORD ES L10FT MPLR LAP

## (undated) DEVICE — GOWN,AURORA,FABRIC-REINFORCED,X-LARGE: Brand: MEDLINE

## (undated) DEVICE — SYRINGE 20ML LL S/C 50

## (undated) DEVICE — TROCAR: Brand: KII® SLEEVE

## (undated) DEVICE — HYPODERMIC SAFETY NEEDLE: Brand: MAGELLAN

## (undated) DEVICE — BANDAGE COMPR W2INXL5YD WHT BGE POLY COT M E WRP WV HK AND

## (undated) DEVICE — GLOVE SURG 7 BIOGEL PI ULTRATOUCH G

## (undated) DEVICE — GOWN,ECLIPSE,NONRNF,XL,ST,30/CS: Brand: MEDLINE

## (undated) DEVICE — TISSUE RETRIEVAL SYSTEM: Brand: INZII RETRIEVAL SYSTEM

## (undated) DEVICE — TROCAR: Brand: KII FIOS FIRST ENTRY

## (undated) DEVICE — SOUTHSIDE TURNOVER: Brand: MEDLINE INDUSTRIES, INC.

## (undated) DEVICE — APPLICATOR MEDICATED 26 CC SOLUTION HI LT ORNG CHLORAPREP

## (undated) DEVICE — SUTURE ETHILON SZ 3-0 L18IN NONABSORBABLE BLK L24MM PS-1 3/8 1663G

## (undated) DEVICE — SYRINGE,EAR/ULCER, 2 OZ, STERILE: Brand: MEDLINE

## (undated) DEVICE — SOLUTION IRRIG 500ML 0.9% SOD CHLO USP POUR PLAS BTL

## (undated) DEVICE — LAPAROSCOPIC CHOLE PACK: Brand: MEDLINE INDUSTRIES, INC.

## (undated) DEVICE — SUTURE VICRYL + SZ 0 L27IN ABSRB VLT L26MM UR-6 5/8 CIR VCP603H

## (undated) DEVICE — TOWEL,OR,DSP,ST,BLUE,STD,4/PK,20PK/CS: Brand: MEDLINE

## (undated) DEVICE — DRESSING,GAUZE,XEROFORM,CURAD,1"X8",ST: Brand: CURAD

## (undated) DEVICE — BNDG,ELSTC,MATRIX,STRL,2"X5YD,LF,HOOK&LP: Brand: MEDLINE

## (undated) DEVICE — PACK,EXTREMITY III: Brand: MEDLINE

## (undated) DEVICE — GLOVE SURG SZ 65 L12IN FNGR THK79MIL GRN LTX FREE

## (undated) DEVICE — GLOVE SURG UNDERGLOVE 7.5 PF BLU BIOGEL PI MIC LF

## (undated) DEVICE — LIQUIBAND RAPID ADHESIVE 36/CS 0.8ML: Brand: MEDLINE

## (undated) DEVICE — SHEET,DRAPE,70X100,STERILE: Brand: MEDLINE

## (undated) DEVICE — GLOVE SURG SZ 65 THK91MIL LTX FREE SYN POLYISOPRENE